# Patient Record
Sex: MALE | Race: WHITE | NOT HISPANIC OR LATINO | Employment: FULL TIME | URBAN - METROPOLITAN AREA
[De-identification: names, ages, dates, MRNs, and addresses within clinical notes are randomized per-mention and may not be internally consistent; named-entity substitution may affect disease eponyms.]

---

## 2017-10-24 ENCOUNTER — HOSPITAL ENCOUNTER (EMERGENCY)
Facility: HOSPITAL | Age: 27
Discharge: HOME | End: 2017-10-24
Attending: EMERGENCY MEDICINE | Admitting: EMERGENCY MEDICINE

## 2017-10-24 ENCOUNTER — APPOINTMENT (OUTPATIENT)
Dept: ULTRASOUND IMAGING | Facility: HOSPITAL | Age: 27
End: 2017-10-24

## 2017-10-24 VITALS
RESPIRATION RATE: 18 BRPM | HEART RATE: 112 BPM | HEIGHT: 67 IN | SYSTOLIC BLOOD PRESSURE: 162 MMHG | TEMPERATURE: 97.6 F | WEIGHT: 215 LBS | BODY MASS INDEX: 33.74 KG/M2 | OXYGEN SATURATION: 100 % | DIASTOLIC BLOOD PRESSURE: 90 MMHG

## 2017-10-24 DIAGNOSIS — R00.9 ELEVATED HEART RATE WITH ELEVATED BLOOD PRESSURE WITHOUT DIAGNOSIS OF HYPERTENSION: ICD-10-CM

## 2017-10-24 DIAGNOSIS — S76.211A GROIN STRAIN, RIGHT, INITIAL ENCOUNTER: Primary | ICD-10-CM

## 2017-10-24 DIAGNOSIS — R03.0 ELEVATED HEART RATE WITH ELEVATED BLOOD PRESSURE WITHOUT DIAGNOSIS OF HYPERTENSION: ICD-10-CM

## 2017-10-24 LAB
ANION GAP SERPL CALCULATED.3IONS-SCNC: 5 MMOL/L (ref 3–11)
BASOPHILS # BLD AUTO: 0.04 10*3/MM3 (ref 0–0.2)
BASOPHILS NFR BLD AUTO: 0.4 % (ref 0–1)
BILIRUB UR QL STRIP: NEGATIVE
BUN BLD-MCNC: 15 MG/DL (ref 9–23)
BUN/CREAT SERPL: 13.6 (ref 7–25)
CALCIUM SPEC-SCNC: 9.4 MG/DL (ref 8.7–10.4)
CHLORIDE SERPL-SCNC: 104 MMOL/L (ref 99–109)
CLARITY UR: CLEAR
CO2 SERPL-SCNC: 28 MMOL/L (ref 20–31)
COLOR UR: YELLOW
CREAT BLD-MCNC: 1.1 MG/DL (ref 0.6–1.3)
DEPRECATED RDW RBC AUTO: 39.7 FL (ref 37–54)
EOSINOPHIL # BLD AUTO: 0.29 10*3/MM3 (ref 0–0.3)
EOSINOPHIL NFR BLD AUTO: 2.6 % (ref 0–3)
ERYTHROCYTE [DISTWIDTH] IN BLOOD BY AUTOMATED COUNT: 12.5 % (ref 11.3–14.5)
GFR SERPL CREATININE-BSD FRML MDRD: 80 ML/MIN/1.73
GLUCOSE BLD-MCNC: 119 MG/DL (ref 70–100)
GLUCOSE UR STRIP-MCNC: NEGATIVE MG/DL
HCT VFR BLD AUTO: 49.2 % (ref 38.9–50.9)
HGB BLD-MCNC: 16.7 G/DL (ref 13.1–17.5)
HGB UR QL STRIP.AUTO: NEGATIVE
IMM GRANULOCYTES # BLD: 0.02 10*3/MM3 (ref 0–0.03)
IMM GRANULOCYTES NFR BLD: 0.2 % (ref 0–0.6)
KETONES UR QL STRIP: NEGATIVE
LEUKOCYTE ESTERASE UR QL STRIP.AUTO: NEGATIVE
LYMPHOCYTES # BLD AUTO: 2.73 10*3/MM3 (ref 0.6–4.8)
LYMPHOCYTES NFR BLD AUTO: 24.7 % (ref 24–44)
MCH RBC QN AUTO: 29.6 PG (ref 27–31)
MCHC RBC AUTO-ENTMCNC: 33.9 G/DL (ref 32–36)
MCV RBC AUTO: 87.2 FL (ref 80–99)
MONOCYTES # BLD AUTO: 0.89 10*3/MM3 (ref 0–1)
MONOCYTES NFR BLD AUTO: 8 % (ref 0–12)
NEUTROPHILS # BLD AUTO: 7.09 10*3/MM3 (ref 1.5–8.3)
NEUTROPHILS NFR BLD AUTO: 64.1 % (ref 41–71)
NITRITE UR QL STRIP: NEGATIVE
PH UR STRIP.AUTO: 6.5 [PH] (ref 5–8)
PLATELET # BLD AUTO: 199 10*3/MM3 (ref 150–450)
PMV BLD AUTO: 9.8 FL (ref 6–12)
POTASSIUM BLD-SCNC: 3.8 MMOL/L (ref 3.5–5.5)
PROT UR QL STRIP: NEGATIVE
RBC # BLD AUTO: 5.64 10*6/MM3 (ref 4.2–5.76)
SODIUM BLD-SCNC: 137 MMOL/L (ref 132–146)
SP GR UR STRIP: 1.03 (ref 1–1.03)
UROBILINOGEN UR QL STRIP: NORMAL
WBC NRBC COR # BLD: 11.06 10*3/MM3 (ref 3.5–10.8)

## 2017-10-24 PROCEDURE — 85025 COMPLETE CBC W/AUTO DIFF WBC: CPT | Performed by: PHYSICIAN ASSISTANT

## 2017-10-24 PROCEDURE — 81003 URINALYSIS AUTO W/O SCOPE: CPT | Performed by: PHYSICIAN ASSISTANT

## 2017-10-24 PROCEDURE — 99283 EMERGENCY DEPT VISIT LOW MDM: CPT

## 2017-10-24 PROCEDURE — 80048 BASIC METABOLIC PNL TOTAL CA: CPT | Performed by: PHYSICIAN ASSISTANT

## 2017-10-24 PROCEDURE — 76870 US EXAM SCROTUM: CPT

## 2017-10-24 PROCEDURE — 93976 VASCULAR STUDY: CPT

## 2017-10-24 RX ORDER — HYDROCODONE BITARTRATE AND ACETAMINOPHEN 5; 325 MG/1; MG/1
1 TABLET ORAL ONCE
Status: COMPLETED | OUTPATIENT
Start: 2017-10-24 | End: 2017-10-24

## 2017-10-24 RX ORDER — ALBUTEROL SULFATE 2.5 MG/3ML
2.5 SOLUTION RESPIRATORY (INHALATION) EVERY 4 HOURS PRN
COMMUNITY
End: 2019-11-01

## 2017-10-24 RX ORDER — CETIRIZINE HYDROCHLORIDE 10 MG/1
10 TABLET ORAL DAILY
COMMUNITY
End: 2019-11-01

## 2017-10-24 RX ADMIN — HYDROCODONE BITARTRATE AND ACETAMINOPHEN 1 TABLET: 5; 325 TABLET ORAL at 22:59

## 2017-10-25 NOTE — ED PROVIDER NOTES
Subjective   HPI Comments: This is a 26-year-old male that presents to the ER with right inguinal/groin pain that started spontaneously 2 hours prior to arrival.  Patient says it was intense and sharp.  It radiated from his right groin to his right anterior thigh.  It lasted about 10 minutes and then resolved on its own.  It has occurred at least 2-3 times in the last 2 hours.  He denies any previous similar symptoms.  He denies any testicular pain or swelling.  He denies any bloating sensation, such as a herniated to the inguinal canal.  He denies any low back pain.  He denies any numbness or tingling to his lower extremities.  He denies any urinary symptoms including dysuria, urgency, frequency, or hematuria.  He has not taken anything for the above symptoms.  He has never had any similar symptoms.  He denies any previous abdominal surgery.  He denies any fever or chills. Patient denies any frequent lifting or twisting.  He works at [employer] on the staff there.    Patient is a 26 y.o. male presenting with groin injury.   History provided by:  Patient  Groin Injury   Presenting symptoms: no dysuria, no penile discharge and no scrotal pain    Context: spontaneously    Context: not after injury, not after intercourse, not after urination, not during intercourse and not during urination    Relieved by:  Nothing  Worsened by:  Nothing  Ineffective treatments:  None tried  Associated symptoms: groin pain (right groin pain)    Associated symptoms: no abdominal pain, no diarrhea, no fever, no flank pain, no genital itching, no genital lesions, no genital rash, no nausea, no scrotal swelling, no urinary frequency, no urinary hesitation, no urinary incontinence, no urinary retention and no vomiting    Risk factors: no bladder surgery, no erectile dysfunction, no kidney stones, does not have multiple sexual partners, no new sexual partner, no recent infection and no unprotected sex        Review of Systems  "  Constitutional: Negative for appetite change, chills, fatigue and fever.   HENT: Negative.    Respiratory: Negative for cough, chest tightness, shortness of breath and wheezing.    Cardiovascular: Negative for chest pain and palpitations.   Gastrointestinal: Negative for abdominal distention, abdominal pain, constipation (Normal BM today), diarrhea, nausea and vomiting.   Genitourinary: Negative for bladder incontinence, difficulty urinating, discharge, dysuria, flank pain, frequency, hesitancy and scrotal swelling.        Right groin pain     Musculoskeletal: Negative.    Skin: Negative.    Neurological: Negative for dizziness, syncope, weakness, light-headedness and headaches.   Psychiatric/Behavioral: Negative.        Past Medical History:   Diagnosis Date   • Hypertension     STATES \"ITS WHITE COAT HYPERTENSION\"       Allergies   Allergen Reactions   • Penicillins        Past Surgical History:   Procedure Laterality Date   • MOUTH SURGERY         History reviewed. No pertinent family history.    Social History     Social History   • Marital status:      Spouse name: N/A   • Number of children: N/A   • Years of education: N/A     Social History Main Topics   • Smoking status: Never Smoker   • Smokeless tobacco: None   • Alcohol use No   • Drug use: No   • Sexual activity: Not Asked     Other Topics Concern   • None     Social History Narrative   • None           Objective   Physical Exam   Constitutional: He is oriented to person, place, and time. He appears well-developed and well-nourished. No distress.   HENT:   Head: Normocephalic and atraumatic.   Eyes: Conjunctivae are normal. No scleral icterus.   Neck: Normal range of motion. Neck supple.   Cardiovascular: Normal rate, regular rhythm and normal heart sounds.    Pulmonary/Chest: Effort normal and breath sounds normal. No respiratory distress.   Abdominal: Soft. He exhibits no distension. There is no tenderness. Hernia confirmed negative in the " right inguinal area and confirmed negative in the left inguinal area.   Genitourinary: Testes normal and penis normal. Circumcised.         Musculoskeletal: Normal range of motion. He exhibits no edema.   Lymphadenopathy:     He has no cervical adenopathy. No inguinal adenopathy noted on the right or left side.   Neurological: He is alert and oriented to person, place, and time.   Skin: Skin is warm and dry. He is not diaphoretic.   Psychiatric: His speech is normal and behavior is normal. His mood appears anxious.   Patient appears anxious.  HR increased to 140's when I told him I had to do a scrotal exam.   Nursing note and vitals reviewed.      Procedures         ED Course  ED Course   Comment By Time   Patient's blood pressure and heart rate are elevated.  He denies personal history of hypertension.  He says that he was thoroughly worked up by PCP 3 years ago for elevated blood pressure and tachycardia.  All workup was negative.  Patient denies any over-the-counter supplements or drug use.  He says he has no primary care physician at this time. Laurel Loera PA-C 10/24 2242   Discussed the case with Dr. Parr.  He went and examined the patient.  He gave him a Norco for pain in hopes of bringing his blood pressure down.  He also ordered a testicular ultrasound to rule out torsion. Laurel Loera PA-C 10/24 2319   UA within normal limits.  Testicular ultrasound reveals small bilateral hydroceles.  Unremarkable duplex.  No evidence of testicular torsion. Laurel Loera PA-C 10/24 1859        Recent Results (from the past 24 hour(s))   Basic Metabolic Panel    Collection Time: 10/24/17 10:51 PM   Result Value Ref Range    Glucose 119 (H) 70 - 100 mg/dL    BUN 15 9 - 23 mg/dL    Creatinine 1.10 0.60 - 1.30 mg/dL    Sodium 137 132 - 146 mmol/L    Potassium 3.8 3.5 - 5.5 mmol/L    Chloride 104 99 - 109 mmol/L    CO2 28.0 20.0 - 31.0 mmol/L    Calcium 9.4 8.7 - 10.4 mg/dL    eGFR Non African Amer 80 >60 mL/min/1.73     BUN/Creatinine Ratio 13.6 7.0 - 25.0    Anion Gap 5.0 3.0 - 11.0 mmol/L   CBC Auto Differential    Collection Time: 10/24/17 10:51 PM   Result Value Ref Range    WBC 11.06 (H) 3.50 - 10.80 10*3/mm3    RBC 5.64 4.20 - 5.76 10*6/mm3    Hemoglobin 16.7 13.1 - 17.5 g/dL    Hematocrit 49.2 38.9 - 50.9 %    MCV 87.2 80.0 - 99.0 fL    MCH 29.6 27.0 - 31.0 pg    MCHC 33.9 32.0 - 36.0 g/dL    RDW 12.5 11.3 - 14.5 %    RDW-SD 39.7 37.0 - 54.0 fl    MPV 9.8 6.0 - 12.0 fL    Platelets 199 150 - 450 10*3/mm3    Neutrophil % 64.1 41.0 - 71.0 %    Lymphocyte % 24.7 24.0 - 44.0 %    Monocyte % 8.0 0.0 - 12.0 %    Eosinophil % 2.6 0.0 - 3.0 %    Basophil % 0.4 0.0 - 1.0 %    Immature Grans % 0.2 0.0 - 0.6 %    Neutrophils, Absolute 7.09 1.50 - 8.30 10*3/mm3    Lymphocytes, Absolute 2.73 0.60 - 4.80 10*3/mm3    Monocytes, Absolute 0.89 0.00 - 1.00 10*3/mm3    Eosinophils, Absolute 0.29 0.00 - 0.30 10*3/mm3    Basophils, Absolute 0.04 0.00 - 0.20 10*3/mm3    Immature Grans, Absolute 0.02 0.00 - 0.03 10*3/mm3   Urinalysis With / Culture If Indicated - Urine, Clean Catch    Collection Time: 10/24/17 11:17 PM   Result Value Ref Range    Color, UA Yellow Yellow, Straw    Appearance, UA Clear Clear    pH, UA 6.5 5.0 - 8.0    Specific Gravity, UA 1.026 1.001 - 1.030    Glucose, UA Negative Negative    Ketones, UA Negative Negative    Bilirubin, UA Negative Negative    Blood, UA Negative Negative    Protein, UA Negative Negative    Leuk Esterase, UA Negative Negative    Nitrite, UA Negative Negative    Urobilinogen, UA 0.2 E.U./dL 0.2 - 1.0 E.U./dL     Note: In addition to lab results from this visit, the labs listed above may include labs taken at another facility or during a different encounter within the last 24 hours. Please correlate lab times with ED admission and discharge times for further clarification of the services performed during this visit.    US Testicular or Ovarian Vascular Limited   Final Result   Unremarkable grayscale  examination of the testes.   Small bilateral hydroceles.      _______________________________________________      EXAM:   US Duplex Arterial/Venous of the Scrotum, Complete      CLINICAL HISTORY:   Pain; Groin pain; Patient HX: Right groin pain radiating down leg    intermittently x 2 hours//pt states pain is now not as frequent//denies    surgeries//denies testicular pain//      TECHNIQUE:   Real-time duplex ultrasound scan of the arterial and venous flow of the scrotal    contents with color Doppler flow and spectral waveform analysis.      COMPARISON:   No relevant prior studies available.      FINDINGS:   Symmetric color Doppler flow demonstrated within the testes bilaterally.   Appropriate arterial and venous spectral wave forms demonstrated within the    testes bilaterally.   No increased flow about the epididymides.   Mildly prominent vessels about the lateral aspect of the testes without    increase upon Valsalva.      IMPRESSION:      Unremarkable duplex.   No evidence for testicular torsion.         THIS DOCUMENT HAS BEEN ELECTRONICALLY SIGNED BY TIA GUTIERREZ MD      US Scrotum & Testicles   Final Result   Unremarkable grayscale examination of the testes.   Small bilateral hydroceles.      _______________________________________________      EXAM:   US Duplex Arterial/Venous of the Scrotum, Complete      CLINICAL HISTORY:   Pain; Groin pain; Patient HX: Right groin pain radiating down leg    intermittently x 2 hours//pt states pain is now not as frequent//denies    surgeries//denies testicular pain//      TECHNIQUE:   Real-time duplex ultrasound scan of the arterial and venous flow of the scrotal    contents with color Doppler flow and spectral waveform analysis.      COMPARISON:   No relevant prior studies available.      FINDINGS:   Symmetric color Doppler flow demonstrated within the testes bilaterally.   Appropriate arterial and venous spectral wave forms demonstrated within the    testes bilaterally.  "  No increased flow about the epididymides.   Mildly prominent vessels about the lateral aspect of the testes without    increase upon Valsalva.      IMPRESSION:      Unremarkable duplex.   No evidence for testicular torsion.         THIS DOCUMENT HAS BEEN ELECTRONICALLY SIGNED BY TIA GUTIERREZ MD        Vitals:    10/24/17 2213   BP: (!) 170/105   BP Location: Left arm   Patient Position: Sitting   Pulse: (!) 137   Resp: 20   Temp: 97.6 °F (36.4 °C)   SpO2: 97%   Weight: 215 lb (97.5 kg)   Height: 67\" (170.2 cm)     Medications   HYDROcodone-acetaminophen (NORCO) 5-325 MG per tablet 1 tablet (1 tablet Oral Given 10/24/17 1434)     ECG/EMG Results (last 24 hours)     ** No results found for the last 24 hours. **                  [facility]    Final diagnoses:   Groin strain, right, initial encounter   Elevated heart rate with elevated blood pressure without diagnosis of hypertension            Laurel Loera PA-C  10/24/17 3227    "

## 2017-10-25 NOTE — DISCHARGE INSTRUCTIONS
Labs, urinalysis, and testicular ultrasound showed no acute abnormality.  There is no evidence of testicular torsion.  Recommend patient to take multiple home blood pressure readings did elevated blood pressure and heart rate and follow up closely with primary care physician.  Patient says that he no longer sees Dr. Arcos.  I will give him a list of accepting physicians in the local area to follow up with.  He is to return if any worsening symptoms.    Follow up with one of the [facility] physician groups below to setup primary care. If you have trouble following up, please call Liliam Ling, our transitional care nurse, at [phone].    (Dr. Murphy, Dr. Calero, Dr. Salas, and Dr. Rosas.)  [facility], Primary Care, [phone], [address], [place] [address]    [facility], Primary Care, [phone], [address], [address] [place], [address] [facility] Medical [facility], Primary Care, [phone], [address], [address] [place], [address] [facility] Medical [facility], Primary Care, [phone], [address] [place] [facility], [address] [place], [address]     [place] [facility], Primary Care, [phone], [address], [address] [place], [address]    [place] [facility], Primary Care, [phone], [address]    [place], [address] [facility] Medical [facility], Primary Care, [phone], [address], [address] [place], [address] [facility], Primary Care, [phone], [address], [address] [place], [address] [facility]) [facility], Primary Care, [phone], [address], [address] [place], [address] [facility], Primary Care, [phone],  [address], [address] [place], [id number] [place] [address]     ([facility]) [facility], Primary Care, [phone], [address], [place], [address] [place] [address], Primary Care, [phone], [address] [place] [address]

## 2017-12-05 ENCOUNTER — OFFICE VISIT (OUTPATIENT)
Dept: INTERNAL MEDICINE | Facility: CLINIC | Age: 27
End: 2017-12-05

## 2017-12-05 VITALS — WEIGHT: 214 LBS | SYSTOLIC BLOOD PRESSURE: 148 MMHG | BODY MASS INDEX: 33.52 KG/M2 | DIASTOLIC BLOOD PRESSURE: 86 MMHG

## 2017-12-05 DIAGNOSIS — S67.191A CRUSHING INJURY OF LEFT INDEX FINGER, INITIAL ENCOUNTER: Primary | ICD-10-CM

## 2017-12-05 PROCEDURE — 99202 OFFICE O/P NEW SF 15 MIN: CPT | Performed by: NURSE PRACTITIONER

## 2017-12-05 RX ORDER — ALBUTEROL SULFATE 90 UG/1
POWDER, METERED RESPIRATORY (INHALATION) AS NEEDED
Refills: 0 | COMMUNITY
Start: 2017-10-24 | End: 2019-11-01

## 2017-12-05 NOTE — PROGRESS NOTES
"  Chief Complaint   Patient presents with   • Left pointer finger pain and swelling, slammed in a car door       HPI  Hayder Farley is a 27 y.o. male who presents s/p slamming left index finger in a car door 4 days ago. Skin intact. Seems more swollen and tip feels a little tingly. Has been taking ibuprofen.      [facility]  The following portions of the patient's history were reviewed and updated as appropriate: allergies, current medications, past family history, past medical history, past social history, past surgical history and problem list.    Past Medical History:   Diagnosis Date   • Hypertension     STATES \"ITS WHITE COAT HYPERTENSION\"     Past Surgical History:   Procedure Laterality Date   • MOUTH SURGERY       There are no active problems to display for this patient.    Social History   Substance Use Topics   • Smoking status: Never Smoker   • Smokeless tobacco: Never Used   • Alcohol use No     Current Outpatient Prescriptions on File Prior to Visit   Medication Sig Dispense Refill   • albuterol (PROVENTIL) (2.5 MG/3ML) 0.083% nebulizer solution Take 2.5 mg by nebulization Every 4 (Four) Hours As Needed for Wheezing.     • cetirizine (zyrTEC) 10 MG tablet Take 10 mg by mouth Daily.       No current facility-administered medications on file prior to visit.          Review of Systems        Objective   Blood pressure 148/86, weight 97.1 kg (214 lb).  Body mass index is 33.52 kg/(m^2).      Physical Exam   Constitutional: He appears well-developed and well-nourished. No distress.   Cardiovascular: Normal rate.    Pulmonary/Chest: Effort normal.   Musculoskeletal:        Left hand: He exhibits normal capillary refill.        Hands:  Distal left index finger with soft swelling, there is ecchymosis visible under nail. No warmth or drainage.   Skin: Skin is warm and dry.   Psychiatric: He has a normal mood and affect. His behavior is normal.             ASSESSMENT/PLAN  1. Crushing injury of left index finger, " initial encounter    - XR Finger 2+ View Left (In Office); Future  Soak in epsom salts & tepid water 3-4 times/day, can asael tape for comfort. Tylenol prn.  RTC or pcp if drainage, fever, increased pain.      Plan of care reviewed with patient at the conclusion of today's visit. Education was provided in regards to diagnosis, management and any prescribed or recommended OTC medications.  Patient verbalizes Understanding of and agreement with management plan.      FOLLOW-UPprn    No future appointments.      Electronically signed by:  BRENDON Salomon  12/05/2017

## 2017-12-06 ENCOUNTER — HOSPITAL ENCOUNTER (OUTPATIENT)
Dept: GENERAL RADIOLOGY | Facility: HOSPITAL | Age: 27
Discharge: HOME | End: 2017-12-06
Admitting: NURSE PRACTITIONER

## 2017-12-06 ENCOUNTER — TELEPHONE (OUTPATIENT)
Dept: INTERNAL MEDICINE | Facility: CLINIC | Age: 27
End: 2017-12-06

## 2017-12-06 DIAGNOSIS — S67.191A CRUSHING INJURY OF LEFT INDEX FINGER, INITIAL ENCOUNTER: ICD-10-CM

## 2017-12-06 PROCEDURE — 73140 X-RAY EXAM OF FINGER(S): CPT

## 2017-12-06 NOTE — TELEPHONE ENCOUNTER
----- Message from BRENDON Salomon sent at 12/6/2017 12:09 PM EST -----  Please let patient know that his xray did not show any abnormalities

## 2019-11-01 ENCOUNTER — OFFICE VISIT (OUTPATIENT)
Dept: INTERNAL MEDICINE | Facility: CLINIC | Age: 29
End: 2019-11-01

## 2019-11-01 VITALS
DIASTOLIC BLOOD PRESSURE: 84 MMHG | TEMPERATURE: 98.3 F | SYSTOLIC BLOOD PRESSURE: 144 MMHG | HEART RATE: 98 BPM | HEIGHT: 71 IN | WEIGHT: 221.2 LBS | BODY MASS INDEX: 30.97 KG/M2 | OXYGEN SATURATION: 99 %

## 2019-11-01 DIAGNOSIS — J45.40 MODERATE PERSISTENT ASTHMA WITHOUT COMPLICATION: ICD-10-CM

## 2019-11-01 DIAGNOSIS — I10 ESSENTIAL HYPERTENSION: Primary | ICD-10-CM

## 2019-11-01 DIAGNOSIS — E66.9 CLASS 1 OBESITY WITH SERIOUS COMORBIDITY AND BODY MASS INDEX (BMI) OF 31.0 TO 31.9 IN ADULT, UNSPECIFIED OBESITY TYPE: ICD-10-CM

## 2019-11-01 DIAGNOSIS — Z23 NEED FOR IMMUNIZATION AGAINST INFLUENZA: ICD-10-CM

## 2019-11-01 PROCEDURE — 99204 OFFICE O/P NEW MOD 45 MIN: CPT | Performed by: FAMILY MEDICINE

## 2019-11-01 PROCEDURE — 90471 IMMUNIZATION ADMIN: CPT | Performed by: FAMILY MEDICINE

## 2019-11-01 PROCEDURE — 90674 CCIIV4 VAC NO PRSV 0.5 ML IM: CPT | Performed by: FAMILY MEDICINE

## 2019-11-01 RX ORDER — LISINOPRIL 10 MG/1
10 TABLET ORAL DAILY
COMMUNITY
End: 2019-11-01 | Stop reason: SDUPTHER

## 2019-11-01 RX ORDER — LISINOPRIL 10 MG/1
10 TABLET ORAL DAILY
Qty: 10 TABLET | Refills: 3 | Status: SHIPPED | OUTPATIENT
Start: 2019-11-01 | End: 2019-12-04 | Stop reason: DRUGHIGH

## 2019-11-01 RX ORDER — METOPROLOL SUCCINATE 25 MG/1
25 TABLET, EXTENDED RELEASE ORAL DAILY
COMMUNITY
End: 2019-12-04 | Stop reason: SDUPTHER

## 2019-11-01 RX ORDER — MONTELUKAST SODIUM 10 MG/1
10 TABLET ORAL NIGHTLY
Qty: 30 TABLET | Refills: 5 | Status: SHIPPED | OUTPATIENT
Start: 2019-11-01

## 2019-11-01 RX ORDER — MONTELUKAST SODIUM 10 MG/1
10 TABLET ORAL NIGHTLY
COMMUNITY
End: 2019-11-01 | Stop reason: SDUPTHER

## 2019-11-01 RX ORDER — ALBUTEROL SULFATE 90 UG/1
2 AEROSOL, METERED RESPIRATORY (INHALATION) EVERY 4 HOURS PRN
Qty: 18 G | Refills: 3 | Status: SHIPPED | OUTPATIENT
Start: 2019-11-01

## 2019-11-01 NOTE — PATIENT INSTRUCTIONS
"DASH Eating Plan  DASH stands for \"Dietary Approaches to Stop Hypertension.\" The DASH eating plan is a healthy eating plan that has been shown to reduce high blood pressure (hypertension). It may also reduce your risk for type 2 diabetes, heart disease, and stroke. The DASH eating plan may also help with weight loss.  What are tips for following this plan?    General guidelines  · Avoid eating more than 2,300 mg (milligrams) of salt (sodium) a day. If you have hypertension, you may need to reduce your sodium intake to 1,500 mg a day.  · Limit alcohol intake to no more than 1 drink a day for nonpregnant women and 2 drinks a day for men. One drink equals 12 oz of beer, 5 oz of wine, or 1½ oz of hard liquor.  · Work with your health care provider to maintain a healthy body weight or to lose weight. Ask what an ideal weight is for you.  · Get at least 30 minutes of exercise that causes your heart to beat faster (aerobic exercise) most days of the week. Activities may include walking, swimming, or biking.  · Work with your health care provider or diet and nutrition specialist (dietitian) to adjust your eating plan to your individual calorie needs.  Reading food labels    · Check food labels for the amount of sodium per serving. Choose foods with less than 5 percent of the Daily Value of sodium. Generally, foods with less than 300 mg of sodium per serving fit into this eating plan.  · To find whole grains, look for the word \"whole\" as the first word in the ingredient list.  Shopping  · Buy products labeled as \"low-sodium\" or \"no salt added.\"  · Buy fresh foods. Avoid canned foods and premade or frozen meals.  Cooking  · Avoid adding salt when cooking. Use salt-free seasonings or herbs instead of table salt or sea salt. Check with your health care provider or pharmacist before using salt substitutes.  · Do not ledbetter foods. Cook foods using healthy methods such as baking, boiling, grilling, and broiling instead.  · Cook with " heart-healthy oils, such as olive, canola, soybean, or sunflower oil.  Meal planning  · Eat a balanced diet that includes:  ? 5 or more servings of fruits and vegetables each day. At each meal, try to fill half of your plate with fruits and vegetables.  ? Up to 6-8 servings of whole grains each day.  ? Less than 6 oz of lean meat, poultry, or fish each day. A 3-oz serving of meat is about the same size as a deck of cards. One egg equals 1 oz.  ? 2 servings of low-fat dairy each day.  ? A serving of nuts, seeds, or beans 5 times each week.  ? Heart-healthy fats. Healthy fats called Omega-3 fatty acids are found in foods such as flaxseeds and coldwater fish, like sardines, salmon, and mackerel.  · Limit how much you eat of the following:  ? Canned or prepackaged foods.  ? Food that is high in trans fat, such as fried foods.  ? Food that is high in saturated fat, such as fatty meat.  ? Sweets, desserts, sugary drinks, and other foods with added sugar.  ? Full-fat dairy products.  · Do not salt foods before eating.  · Try to eat at least 2 vegetarian meals each week.  · Eat more home-cooked food and less restaurant, buffet, and fast food.  · When eating at a restaurant, ask that your food be prepared with less salt or no salt, if possible.  What foods are recommended?  The items listed may not be a complete list. Talk with your dietitian about what dietary choices are best for you.  Grains  Whole-grain or whole-wheat bread. Whole-grain or whole-wheat pasta. Brown rice. Oatmeal. Quinoa. Bulgur. Whole-grain and low-sodium cereals. Petra bread. Low-fat, low-sodium crackers. Whole-wheat flour tortillas.  Vegetables  Fresh or frozen vegetables (raw, steamed, roasted, or grilled). Low-sodium or reduced-sodium tomato and vegetable juice. Low-sodium or reduced-sodium tomato sauce and tomato paste. Low-sodium or reduced-sodium canned vegetables.  Fruits  All fresh, dried, or frozen fruit. Canned fruit in natural juice (without  added sugar).  Meat and other protein foods  Skinless chicken or turkey. Ground chicken or turkey. Pork with fat trimmed off. Fish and seafood. Egg whites. Dried beans, peas, or lentils. Unsalted nuts, nut butters, and seeds. Unsalted canned beans. Lean cuts of beef with fat trimmed off. Low-sodium, lean deli meat.  Dairy  Low-fat (1%) or fat-free (skim) milk. Fat-free, low-fat, or reduced-fat cheeses. Nonfat, low-sodium ricotta or cottage cheese. Low-fat or nonfat yogurt. Low-fat, low-sodium cheese.  Fats and oils  Soft margarine without trans fats. Vegetable oil. Low-fat, reduced-fat, or light mayonnaise and salad dressings (reduced-sodium). Canola, safflower, olive, soybean, and sunflower oils. Avocado.  Seasoning and other foods  Herbs. Spices. Seasoning mixes without salt. Unsalted popcorn and pretzels. Fat-free sweets.  What foods are not recommended?  The items listed may not be a complete list. Talk with your dietitian about what dietary choices are best for you.  Grains  Baked goods made with fat, such as croissants, muffins, or some breads. Dry pasta or rice meal packs.  Vegetables  Creamed or fried vegetables. Vegetables in a cheese sauce. Regular canned vegetables (not low-sodium or reduced-sodium). Regular canned tomato sauce and paste (not low-sodium or reduced-sodium). Regular tomato and vegetable juice (not low-sodium or reduced-sodium). Pickles. Olives.  Fruits  Canned fruit in a light or heavy syrup. Fried fruit. Fruit in cream or butter sauce.  Meat and other protein foods  Fatty cuts of meat. Ribs. Fried meat. Lee. Sausage. Bologna and other processed lunch meats. Salami. Fatback. Hotdogs. Bratwurst. Salted nuts and seeds. Canned beans with added salt. Canned or smoked fish. Whole eggs or egg yolks. Chicken or turkey with skin.  Dairy  Whole or 2% milk, cream, and half-and-half. Whole or full-fat cream cheese. Whole-fat or sweetened yogurt. Full-fat cheese. Nondairy creamers. Whipped toppings.  Processed cheese and cheese spreads.  Fats and oils  Butter. Stick margarine. Lard. Shortening. Ghee. Lee fat. Tropical oils, such as coconut, palm kernel, or palm oil.  Seasoning and other foods  Salted popcorn and pretzels. Onion salt, garlic salt, seasoned salt, table salt, and sea salt. Worcestershire sauce. Tartar sauce. Barbecue sauce. Teriyaki sauce. Soy sauce, including reduced-sodium. Steak sauce. Canned and packaged gravies. Fish sauce. Oyster sauce. Cocktail sauce. Horseradish that you find on the shelf. Ketchup. Mustard. Meat flavorings and tenderizers. Bouillon cubes. Hot sauce and Tabasco sauce. Premade or packaged marinades. Premade or packaged taco seasonings. Relishes. Regular salad dressings.  Where to find more information:  · National Heart, Lung, and Blood [place]: www.nhlbi.nih.gov  · American Heart Association: www.heart.org  Summary  · The DASH eating plan is a healthy eating plan that has been shown to reduce high blood pressure (hypertension). It may also reduce your risk for type 2 diabetes, heart disease, and stroke.  · With the DASH eating plan, you should limit salt (sodium) intake to 2,300 mg a day. If you have hypertension, you may need to reduce your sodium intake to 1,500 mg a day.  · When on the DASH eating plan, aim to eat more fresh fruits and vegetables, whole grains, lean proteins, low-fat dairy, and heart-healthy fats.  · Work with your health care provider or diet and nutrition specialist (dietitian) to adjust your eating plan to your individual calorie needs.  This information is not intended to replace advice given to you by your health care provider. Make sure you discuss any questions you have with your health care provider.  Document Released: 12/06/2012 Document Revised: 12/11/2017 Document Reviewed: 12/11/2017  [employer] Interactive Patient Education © 2019 [employer]

## 2019-11-01 NOTE — PROGRESS NOTES
"Subjective   Hayder Farley is a 28 y.o. male.     Chief Complaint   Patient presents with   • Establish Care   • Hypertension     to take over maintanence medication       Visit Vitals  /84   Pulse 98   Temp 98.3 °F (36.8 °C)   Ht 179.1 cm (70.5\")   Wt 100 kg (221 lb 3.2 oz)   SpO2 99%   BMI 31.29 kg/m²         Hypertension   This is a chronic problem. The current episode started more than 1 year ago. The problem is unchanged. The problem is controlled. Pertinent negatives include no anxiety, blurred vision, chest pain, headaches, malaise/fatigue, neck pain, orthopnea, palpitations, peripheral edema, PND, shortness of breath or sweats. There are no associated agents to hypertension. Risk factors for coronary artery disease include family history and male gender. Current antihypertension treatment includes ACE inhibitors and beta blockers. There are no compliance problems.  There is no history of angina, kidney disease, CAD/MI, CVA, heart failure, left ventricular hypertrophy, PVD or retinopathy.   Asthma   There is no chest tightness, cough, difficulty breathing, frequent throat clearing, hemoptysis, hoarse voice, shortness of breath, sputum production or wheezing. This is a recurrent problem. The current episode started more than 1 year ago. The problem occurs rarely. The problem has been resolved (no symptoms on singulair). Associated symptoms include postnasal drip, rhinorrhea and weight loss (pt trying to lose weight). Pertinent negatives include no appetite change, chest pain, dyspnea on exertion, ear congestion, ear pain, fever, headaches, heartburn, malaise/fatigue, myalgias, nasal congestion, orthopnea, PND, sneezing, sore throat, sweats or trouble swallowing. His symptoms are aggravated by exercise. His symptoms are alleviated by leukotriene antagonist. He reports significant improvement on treatment. Risk factors for lung disease include animal exposure (pt has cats and is allergic). His past medical " "history is significant for asthma. There is no history of bronchiectasis, bronchitis, COPD, emphysema or pneumonia.        Pt is here to establish.  Pt is trying to lose weight and DASH diet.   Pt had [facility] BP last month at [facility].  The following portions of the patient's history were reviewed and updated as appropriate: allergies, current medications, past family history, past medical history, past social history, past surgical history and problem list.    Past Medical History:   Diagnosis Date   • Allergic 1995    As a toddler   • Asthma    • Essential hypertension 11/1/2019   • Hypertension     STATES \"ITS WHITE COAT HYPERTENSION\"      Past Surgical History:   Procedure Laterality Date   • MOUTH SURGERY        Family History   Problem Relation Age of Onset   • Cancer Mother         Breast Cancer   • Breast cancer Mother    • Heart attack Maternal Grandfather    • Heart disease Maternal Grandfather    • Heart attack Paternal Grandfather    • Heart disease Paternal Grandfather    • Asthma Father    • Hyperlipidemia Father    • Heart disease Paternal Uncle    • Heart disease Maternal Grandmother    • Heart disease Paternal Grandmother       Social History     Socioeconomic History   • Marital status:      Spouse name: Not on file   • Number of children: Not on file   • Years of education: Not on file   • Highest education level: Not on file   Tobacco Use   • Smoking status: Never Smoker   • Smokeless tobacco: Never Used   Substance and Sexual Activity   • Alcohol use: No   • Drug use: No   • Sexual activity: Yes     Partners: Female     Birth control/protection: None      Allergies   Allergen Reactions   • Penicillins Other (See Comments)     As baby, pt does not know reaction       Review of Systems   Constitutional: Positive for weight loss (pt trying to lose weight). Negative for appetite change, chills, diaphoresis, fatigue, fever and malaise/fatigue.   HENT: Positive for postnasal drip and " rhinorrhea. Negative for ear pain, hoarse voice, nosebleeds, sinus pressure, sneezing, sore throat and trouble swallowing.    Eyes: Negative.  Negative for blurred vision, redness and itching.   Respiratory: Negative.  Negative for cough, hemoptysis, sputum production, shortness of breath and wheezing.    Cardiovascular: Negative.  Negative for chest pain, dyspnea on exertion, palpitations, orthopnea and PND.   Gastrointestinal: Negative.  Negative for abdominal pain, constipation, diarrhea, heartburn, nausea and vomiting.   Endocrine: Negative.  Negative for cold intolerance and heat intolerance.   Genitourinary: Negative.  Negative for dysuria, frequency, hematuria and urgency.   Musculoskeletal: Negative.  Negative for arthralgias, back pain, myalgias and neck pain.   Skin: Negative.  Negative for color change and rash.   Allergic/Immunologic: Positive for environmental allergies.   Neurological: Negative.  Negative for dizziness, syncope, light-headedness and headaches.   Hematological: Negative.  Negative for adenopathy. Does not bruise/bleed easily.   Psychiatric/Behavioral: Negative.  Negative for dysphoric mood. The patient is not nervous/anxious.        Objective   Physical Exam   Constitutional: He is oriented to person, place, and time. He appears well-developed.   HENT:   Head: Normocephalic.   Right Ear: External ear normal.   Left Ear: External ear normal.   Nose: Nose normal.   Eyes: Conjunctivae, EOM and lids are normal. Pupils are equal, round, and reactive to light.   Neck: Trachea normal and normal range of motion. Neck supple. Carotid bruit is not present. No thyroid mass and no thyromegaly present.   Cardiovascular: Normal rate and regular rhythm.   No murmur heard.  Pulmonary/Chest: Effort normal and breath sounds normal. No respiratory distress. He has no decreased breath sounds. He has no wheezes. He has no rhonchi. He has no rales. He exhibits no tenderness.   Abdominal: Soft. Bowel sounds  are normal. There is no tenderness.   Musculoskeletal: Normal range of motion.   Neurological: He is alert and oriented to person, place, and time.   Skin: Skin is warm and dry.   Psychiatric: He has a normal mood and affect. His behavior is normal.   Nursing note and vitals reviewed.      Assessment/Plan   Hayder was seen today for establish care and hypertension.    Diagnoses and all orders for this visit:    Essential hypertension  -     Comprehensive Metabolic Panel  -     Lipid Panel  -     TSH  -     CBC & Differential  -     lisinopril (PRINIVIL,ZESTRIL) 10 MG tablet; Take 1 tablet by mouth Daily.  -     Urinalysis With Microscopic -    Need for immunization against influenza  -     Flucelvax Quad=>4Years ([id number])    Moderate persistent asthma without complication  -     montelukast (SINGULAIR) 10 MG tablet; Take 1 tablet by mouth Every Night.  -     albuterol sulfate  (90 Base) MCG/ACT inhaler; Inhale 2 puffs Every 4 (Four) Hours As Needed for Wheezing.    Class 1 obesity with serious comorbidity and body mass index (BMI) of 31.0 to 31.9 in adult, unspecified obesity type        Handout on DASH diet           Current Outpatient Medications:   •  lisinopril (PRINIVIL,ZESTRIL) 10 MG tablet, Take 1 tablet by mouth Daily., Disp: 10 tablet, Rfl: 3  •  metoprolol succinate XL (TOPROL-XL) 25 MG 24 hr tablet, Take 25 mg by mouth Daily., Disp: , Rfl:   •  montelukast (SINGULAIR) 10 MG tablet, Take 1 tablet by mouth Every Night., Disp: 30 tablet, Rfl: 5  •  albuterol sulfate  (90 Base) MCG/ACT inhaler, Inhale 2 puffs Every 4 (Four) Hours As Needed for Wheezing., Disp: 18 g, Rfl: 3    Return in about 4 weeks (around 11/29/2019), or if symptoms worsen or fail to improve, for Recheck, BP with nurse 1 week.

## 2019-11-02 LAB
ALBUMIN SERPL-MCNC: 5.1 G/DL (ref 3.5–5.2)
ALBUMIN/GLOB SERPL: 2.4 G/DL
ALP SERPL-CCNC: 92 U/L (ref 39–117)
ALT SERPL-CCNC: 23 U/L (ref 1–41)
APPEARANCE UR: CLEAR
AST SERPL-CCNC: 21 U/L (ref 1–40)
BACTERIA #/AREA URNS HPF: ABNORMAL /HPF
BASOPHILS # BLD AUTO: 0.05 10*3/MM3 (ref 0–0.2)
BASOPHILS NFR BLD AUTO: 0.6 % (ref 0–1.5)
BILIRUB SERPL-MCNC: 0.5 MG/DL (ref 0.2–1.2)
BILIRUB UR QL STRIP: NEGATIVE
BUN SERPL-MCNC: 12 MG/DL (ref 6–20)
BUN/CREAT SERPL: 12 (ref 7–25)
CALCIUM SERPL-MCNC: 9.8 MG/DL (ref 8.6–10.5)
CASTS URNS MICRO: ABNORMAL
CHLORIDE SERPL-SCNC: 99 MMOL/L (ref 98–107)
CHOLEST SERPL-MCNC: 203 MG/DL (ref 0–200)
CO2 SERPL-SCNC: 27.2 MMOL/L (ref 22–29)
COLOR UR: YELLOW
CREAT SERPL-MCNC: 1 MG/DL (ref 0.76–1.27)
EOSINOPHIL # BLD AUTO: 0.11 10*3/MM3 (ref 0–0.4)
EOSINOPHIL NFR BLD AUTO: 1.4 % (ref 0.3–6.2)
EPI CELLS #/AREA URNS HPF: ABNORMAL /HPF
ERYTHROCYTE [DISTWIDTH] IN BLOOD BY AUTOMATED COUNT: 12.5 % (ref 12.3–15.4)
GLOBULIN SER CALC-MCNC: 2.1 GM/DL
GLUCOSE SERPL-MCNC: 107 MG/DL (ref 65–99)
GLUCOSE UR QL: NEGATIVE
HCT VFR BLD AUTO: 49.2 % (ref 37.5–51)
HDLC SERPL-MCNC: 42 MG/DL (ref 40–60)
HGB BLD-MCNC: 16.6 G/DL (ref 13–17.7)
HGB UR QL STRIP: NEGATIVE
IMM GRANULOCYTES # BLD AUTO: 0.02 10*3/MM3 (ref 0–0.05)
IMM GRANULOCYTES NFR BLD AUTO: 0.3 % (ref 0–0.5)
KETONES UR QL STRIP: NEGATIVE
LDLC SERPL CALC-MCNC: 139 MG/DL (ref 0–100)
LEUKOCYTE ESTERASE UR QL STRIP: (no result)
LYMPHOCYTES # BLD AUTO: 1.46 10*3/MM3 (ref 0.7–3.1)
LYMPHOCYTES NFR BLD AUTO: 18.8 % (ref 19.6–45.3)
MCH RBC QN AUTO: 29.4 PG (ref 26.6–33)
MCHC RBC AUTO-ENTMCNC: 33.7 G/DL (ref 31.5–35.7)
MCV RBC AUTO: 87.1 FL (ref 79–97)
MONOCYTES # BLD AUTO: 0.9 10*3/MM3 (ref 0.1–0.9)
MONOCYTES NFR BLD AUTO: 11.6 % (ref 5–12)
NEUTROPHILS # BLD AUTO: 5.22 10*3/MM3 (ref 1.7–7)
NEUTROPHILS NFR BLD AUTO: 67.3 % (ref 42.7–76)
NITRITE UR QL STRIP: NEGATIVE
NRBC BLD AUTO-RTO: 0 /100 WBC (ref 0–0.2)
PH UR STRIP: 6.5 [PH] (ref 5–8)
PLATELET # BLD AUTO: 262 10*3/MM3 (ref 140–450)
POTASSIUM SERPL-SCNC: 5 MMOL/L (ref 3.5–5.2)
PROT SERPL-MCNC: 7.2 G/DL (ref 6–8.5)
PROT UR QL STRIP: NEGATIVE
RBC # BLD AUTO: 5.65 10*6/MM3 (ref 4.14–5.8)
RBC #/AREA URNS HPF: ABNORMAL /HPF
SODIUM SERPL-SCNC: 138 MMOL/L (ref 136–145)
SP GR UR: 1.02 (ref 1–1.03)
TRIGL SERPL-MCNC: 109 MG/DL (ref 0–150)
TSH SERPL DL<=0.005 MIU/L-ACNC: 1.16 UIU/ML (ref 0.27–4.2)
UROBILINOGEN UR STRIP-MCNC: (no result) MG/DL
VLDLC SERPL CALC-MCNC: 21.8 MG/DL
WBC # BLD AUTO: 7.76 10*3/MM3 (ref 3.4–10.8)
WBC #/AREA URNS HPF: ABNORMAL /HPF

## 2019-12-04 ENCOUNTER — OFFICE VISIT (OUTPATIENT)
Dept: INTERNAL MEDICINE | Facility: CLINIC | Age: 29
End: 2019-12-04

## 2019-12-04 VITALS
HEIGHT: 71 IN | OXYGEN SATURATION: 99 % | DIASTOLIC BLOOD PRESSURE: 98 MMHG | TEMPERATURE: 97 F | HEART RATE: 134 BPM | WEIGHT: 223.8 LBS | BODY MASS INDEX: 31.33 KG/M2 | SYSTOLIC BLOOD PRESSURE: 140 MMHG

## 2019-12-04 DIAGNOSIS — I10 ESSENTIAL HYPERTENSION: Primary | ICD-10-CM

## 2019-12-04 PROCEDURE — 99213 OFFICE O/P EST LOW 20 MIN: CPT | Performed by: FAMILY MEDICINE

## 2019-12-04 RX ORDER — LISINOPRIL 20 MG/1
20 TABLET ORAL DAILY
Qty: 30 TABLET | Refills: 1 | Status: SHIPPED | OUTPATIENT
Start: 2019-12-04 | End: 2020-01-10 | Stop reason: SDUPTHER

## 2019-12-04 RX ORDER — CETIRIZINE HYDROCHLORIDE 10 MG/1
TABLET ORAL
COMMUNITY

## 2019-12-04 RX ORDER — METOPROLOL SUCCINATE 25 MG/1
25 TABLET, EXTENDED RELEASE ORAL DAILY
Qty: 30 TABLET | Refills: 1 | Status: SHIPPED | OUTPATIENT
Start: 2019-12-04 | End: 2020-01-10 | Stop reason: DRUGHIGH

## 2019-12-23 ENCOUNTER — CLINICAL SUPPORT (OUTPATIENT)
Dept: INTERNAL MEDICINE | Facility: CLINIC | Age: 29
End: 2019-12-23

## 2019-12-23 VITALS — DIASTOLIC BLOOD PRESSURE: 82 MMHG | SYSTOLIC BLOOD PRESSURE: 138 MMHG

## 2019-12-23 NOTE — PROGRESS NOTES
Pt present for nurse visit BP check.  Reading of 136/82 was obtained.  He has an upcoming appointment on 1/10/2019

## 2020-01-10 ENCOUNTER — OFFICE VISIT (OUTPATIENT)
Dept: INTERNAL MEDICINE | Facility: CLINIC | Age: 30
End: 2020-01-10

## 2020-01-10 VITALS
DIASTOLIC BLOOD PRESSURE: 86 MMHG | HEART RATE: 100 BPM | WEIGHT: 222 LBS | SYSTOLIC BLOOD PRESSURE: 128 MMHG | TEMPERATURE: 98 F | BODY MASS INDEX: 31.08 KG/M2 | OXYGEN SATURATION: 98 % | HEIGHT: 71 IN

## 2020-01-10 DIAGNOSIS — R73.09 ABNORMAL GLUCOSE: Primary | ICD-10-CM

## 2020-01-10 DIAGNOSIS — Z82.49 FAMILY HISTORY OF EARLY CAD: ICD-10-CM

## 2020-01-10 DIAGNOSIS — E66.9 CLASS 1 OBESITY WITH SERIOUS COMORBIDITY AND BODY MASS INDEX (BMI) OF 31.0 TO 31.9 IN ADULT, UNSPECIFIED OBESITY TYPE: ICD-10-CM

## 2020-01-10 DIAGNOSIS — R07.9 CHEST PAIN, UNSPECIFIED TYPE: ICD-10-CM

## 2020-01-10 DIAGNOSIS — I10 ESSENTIAL HYPERTENSION: ICD-10-CM

## 2020-01-10 PROCEDURE — 99214 OFFICE O/P EST MOD 30 MIN: CPT | Performed by: FAMILY MEDICINE

## 2020-01-10 PROCEDURE — 93000 ELECTROCARDIOGRAM COMPLETE: CPT | Performed by: FAMILY MEDICINE

## 2020-01-10 RX ORDER — LISINOPRIL 20 MG/1
20 TABLET ORAL DAILY
Qty: 30 TABLET | Refills: 1 | Status: SHIPPED | OUTPATIENT
Start: 2020-01-10 | End: 2020-02-07 | Stop reason: SINTOL

## 2020-01-10 RX ORDER — METOPROLOL SUCCINATE 50 MG/1
50 TABLET, EXTENDED RELEASE ORAL DAILY
Qty: 30 TABLET | Refills: 1 | Status: SHIPPED | OUTPATIENT
Start: 2020-01-10 | End: 2020-04-28

## 2020-01-10 NOTE — PROGRESS NOTES
"Subjective   Hayder Farley is a 29 y.o. male.     Chief Complaint   Patient presents with   • Hypertension     f/u       Visit Vitals  /86 (BP Location: Right arm, Cuff Size: Adult)   Pulse 100   Temp 98 °F (36.7 °C)   Ht 179.1 cm (70.5\")   Wt 101 kg (222 lb)   SpO2 98%   BMI 31.40 kg/m²       Wt Readings from Last 3 Encounters:   01/10/20 101 kg (222 lb)   12/04/19 102 kg (223 lb 12.8 oz)   11/01/19 100 kg (221 lb 3.2 oz)       Hypertension   This is a chronic problem. The current episode started more than 1 month ago. The problem has been waxing and waning since onset. The problem is uncontrolled. Associated symptoms include chest pain. Pertinent negatives include no anxiety, blurred vision, headaches, malaise/fatigue, neck pain, orthopnea, palpitations, peripheral edema, PND, shortness of breath or sweats. There are no associated agents to hypertension. Risk factors for coronary artery disease include male gender and obesity. Current antihypertension treatment includes beta blockers and ACE inhibitors. The current treatment provides moderate improvement. There are no compliance problems.  There is no history of angina, kidney disease, CAD/MI, CVA, heart failure, left ventricular hypertrophy, PVD or retinopathy. There is no history of sleep apnea or a thyroid problem.   Cough   This is a new problem. The current episode started 1 to 4 weeks ago. The problem has been gradually improving. The cough is non-productive. Associated symptoms include chest pain. Pertinent negatives include no chills, ear congestion, ear pain, eye redness, fever, headaches, heartburn, hemoptysis, myalgias, nasal congestion, postnasal drip, rash, rhinorrhea, sore throat, shortness of breath, sweats, weight loss or wheezing. Nothing aggravates the symptoms. He has tried nothing for the symptoms. The treatment provided moderate relief. His past medical history is significant for environmental allergies. There is no history of asthma, " bronchiectasis, bronchitis, COPD, emphysema or pneumonia.   Chest Pain    This is a new problem. The current episode started 1 to 4 weeks ago. The onset quality is undetermined. The problem occurs intermittently. The problem has been waxing and waning. The pain is present in the substernal region. The pain is at a severity of 2/10. The pain is mild. The quality of the pain is described as dull. The pain does not radiate. Associated symptoms include a cough. Pertinent negatives include no abdominal pain, back pain, claudication, diaphoresis, dizziness, exertional chest pressure, fever, headaches, hemoptysis, irregular heartbeat, leg pain, lower extremity edema, malaise/fatigue, nausea, near-syncope, numbness, orthopnea, palpitations, PND, shortness of breath, sputum production, syncope, vomiting or weakness. The cough has no precipitants. The cough is non-productive. There is no color change associated with the cough. Nothing relieves the cough. Nothing worsens the cough. The pain is aggravated by nothing. He has tried nothing for the symptoms. The treatment provided no relief. Risk factors include male gender.   His past medical history is significant for hypertension.   Pertinent negatives for past medical history include no aneurysm, no anxiety/panic attacks, no aortic aneurysm, no aortic dissection, no arrhythmia, no bicuspid aortic valve, no CAD, no cancer, no congenital heart disease, no connective tissue disease, no COPD, no CHF, no diabetes, no DVT, no hyperhomocysteinemia, no hyperlipidemia, no Kawasaki disease, no Marfan's syndrome, no MI, no mitral valve prolapse, no pacemaker, no PE, no PVD, no recent injury, no rheumatic fever, no seizures, no sickle cell disease, no sleep apnea, no spontaneous pneumothorax, no stimulant use, no strokes, no thyroid problem, no TIA, Stout syndrome and no valve disorder.   His family medical history is significant for CAD, heart disease, hyperlipidemia (dad) and early MI  "(uncle, and mgf).   Pertinent negatives for family medical history include: no aortic dissection, no connective tissue disease, no diabetes, no hypertension, no Marfan's syndrome, no PE, no PVD, no sickle cell disease, no stroke, no sudden death and no TIA.      Pt here for f/u of HTN. Pt has been checking BP at home and brings in his results which show most of blood pressure is elevated and some elevated pulse.       The following portions of the patient's history were reviewed and updated as appropriate: allergies, current medications, past family history, past medical history, past social history, past surgical history and problem list.    Past Medical History:   Diagnosis Date   • Allergic 1995    As a toddler   • Asthma    • Essential hypertension 11/1/2019   • Hypertension     STATES \"ITS WHITE COAT HYPERTENSION\"      Past Surgical History:   Procedure Laterality Date   • MOUTH SURGERY        Family History   Problem Relation Age of Onset   • Cancer Mother         Breast Cancer   • Breast cancer Mother    • Heart attack Maternal Grandfather    • Heart disease Maternal Grandfather    • Heart attack Paternal Grandfather    • Heart disease Paternal Grandfather    • Asthma Father    • Hyperlipidemia Father    • Heart disease Paternal Uncle    • Heart disease Maternal Grandmother    • Heart disease Paternal Grandmother       Social History     Socioeconomic History   • Marital status:      Spouse name: Not on file   • Number of children: Not on file   • Years of education: Not on file   • Highest education level: Not on file   Tobacco Use   • Smoking status: Never Smoker   • Smokeless tobacco: Never Used   Substance and Sexual Activity   • Alcohol use: No   • Drug use: No   • Sexual activity: Yes     Partners: Female     Birth control/protection: None      Allergies   Allergen Reactions   • Penicillins Other (See Comments)     As baby, pt does not know reaction       Review of Systems   Constitutional: " Negative.  Negative for chills, diaphoresis, fatigue, fever, malaise/fatigue and weight loss.   HENT: Negative.  Negative for ear pain, nosebleeds, postnasal drip, rhinorrhea, sinus pressure, sneezing and sore throat.    Eyes: Negative.  Negative for blurred vision, redness and itching.   Respiratory: Positive for cough. Negative for hemoptysis, sputum production, shortness of breath and wheezing.    Cardiovascular: Positive for chest pain. Negative for palpitations, orthopnea, claudication, leg swelling, syncope, PND and near-syncope.   Gastrointestinal: Negative.  Negative for abdominal pain, constipation, diarrhea, heartburn, nausea and vomiting.   Endocrine: Negative.  Negative for cold intolerance and heat intolerance.   Genitourinary: Negative.  Negative for dysuria, frequency, hematuria and urgency.   Musculoskeletal: Negative.  Negative for arthralgias, back pain, myalgias and neck pain.   Skin: Negative.  Negative for color change and rash.   Allergic/Immunologic: Positive for environmental allergies.   Neurological: Negative.  Negative for dizziness, seizures, syncope, weakness, light-headedness, numbness and headaches.   Hematological: Negative.  Negative for adenopathy. Does not bruise/bleed easily.   Psychiatric/Behavioral: Negative.  Negative for dysphoric mood. The patient is not nervous/anxious.        Objective   Physical Exam   Constitutional: He is oriented to person, place, and time. He appears well-developed.   HENT:   Head: Normocephalic.   Right Ear: External ear normal.   Left Ear: External ear normal.   Nose: Nose normal.   Eyes: Pupils are equal, round, and reactive to light. Conjunctivae, EOM and lids are normal.   Neck: Trachea normal and normal range of motion. Neck supple. Carotid bruit is not present. No thyroid mass and no thyromegaly present.   Cardiovascular: Normal rate and regular rhythm.   No murmur heard.  Pulmonary/Chest: Effort normal and breath sounds normal. No respiratory  distress. He has no decreased breath sounds. He has no wheezes. He has no rhonchi. He has no rales. He exhibits no tenderness.   Abdominal: Soft. Bowel sounds are normal. There is no tenderness.   Musculoskeletal: Normal range of motion.   Neurological: He is alert and oriented to person, place, and time.   Skin: Skin is warm and dry.   Psychiatric: He has a normal mood and affect. His behavior is normal.   Nursing note and vitals reviewed.        Assessment/Plan   Hayder was seen today for hypertension.    Diagnoses and all orders for this visit:    Abnormal glucose  -     Hemoglobin A1c    Essential hypertension  -     lisinopril (PRINIVIL,ZESTRIL) 20 MG tablet; Take 1 tablet by mouth Daily.  -     metoprolol succinate XL (TOPROL-XL) 50 MG 24 hr tablet; Take 1 tablet by mouth Daily.  -     Lipid Panel  -     Comprehensive Metabolic Panel    Family history of early CAD  -     ECG 12 Lead  -     Lipid Panel  -     Homocysteine  -     Ambulatory Referral to [facility]    Class 1 obesity with serious comorbidity and body mass index (BMI) of 31.0 to 31.9 in adult, unspecified obesity type    Chest pain, unspecified type  -     ECG 12 Lead  -     Ambulatory Referral to [facility] and [facility]        Pt advised if cough not improving, will discontinue lisinopril  Increase BP med metoprolol.           Current Outpatient Medications:   •  cetirizine (ZYRTEC ALLERGY) 10 MG tablet, Zyrtec 10 mg tablet  Daily, Disp: , Rfl:   •  lisinopril (PRINIVIL,ZESTRIL) 20 MG tablet, Take 1 tablet by mouth Daily., Disp: 30 tablet, Rfl: 1  •  montelukast (SINGULAIR) 10 MG tablet, Take 1 tablet by mouth Every Night., Disp: 30 tablet, Rfl: 5  •  albuterol sulfate  (90 Base) MCG/ACT inhaler, Inhale 2 puffs Every 4 (Four) Hours As Needed for Wheezing., Disp: 18 g, Rfl: 3  •  metoprolol succinate XL (TOPROL-XL) 50 MG 24 hr tablet, Take 1 tablet by mouth Daily., Disp: 30 tablet, Rfl:  1    Return in about 4 weeks (around 2/7/2020), or if symptoms worsen or fail to improve, for Recheck.      ECG 12 Lead  Date/Time: 1/10/2020 8:41 AM  Performed by: Conchis Warner MD  Authorized by: Conchis Warner MD   Comparison: not compared with previous ECG   Previous ECG: no previous ECG available  Rhythm: sinus rhythm  Rate: normal  BPM: 84  Conduction: conduction normal  ST Segments: ST segments normal  QRS axis: normal (P, R, T: 62, 0, 46)  Other: no other findings    Clinical impression: normal ECG  Comments: PT int 138 ms  QRS dur 104 ms  QT/QTc 353/394 ms

## 2020-01-11 LAB
ALBUMIN SERPL-MCNC: 4.7 G/DL (ref 3.5–5.2)
ALBUMIN/GLOB SERPL: 2 G/DL
ALP SERPL-CCNC: 86 U/L (ref 39–117)
ALT SERPL-CCNC: 24 U/L (ref 1–41)
AST SERPL-CCNC: 18 U/L (ref 1–40)
BILIRUB SERPL-MCNC: 0.4 MG/DL (ref 0.2–1.2)
BUN SERPL-MCNC: 14 MG/DL (ref 6–20)
BUN/CREAT SERPL: 15.7 (ref 7–25)
CALCIUM SERPL-MCNC: 9.5 MG/DL (ref 8.6–10.5)
CHLORIDE SERPL-SCNC: 101 MMOL/L (ref 98–107)
CHOLEST SERPL-MCNC: 204 MG/DL (ref 0–200)
CO2 SERPL-SCNC: 25 MMOL/L (ref 22–29)
CREAT SERPL-MCNC: 0.89 MG/DL (ref 0.76–1.27)
GLOBULIN SER CALC-MCNC: 2.3 GM/DL
GLUCOSE SERPL-MCNC: 109 MG/DL (ref 65–99)
HBA1C MFR BLD: 5.5 % (ref 4.8–5.6)
HCYS SERPL-SCNC: 10 UMOL/L (ref 0–15)
HDLC SERPL-MCNC: 39 MG/DL (ref 40–60)
LDLC SERPL CALC-MCNC: 145 MG/DL (ref 0–100)
POTASSIUM SERPL-SCNC: 4.8 MMOL/L (ref 3.5–5.2)
PROT SERPL-MCNC: 7 G/DL (ref 6–8.5)
SODIUM SERPL-SCNC: 140 MMOL/L (ref 136–145)
TRIGL SERPL-MCNC: 102 MG/DL (ref 0–150)
VLDLC SERPL CALC-MCNC: 20.4 MG/DL

## 2020-01-12 PROBLEM — E78.5 HYPERLIPIDEMIA LDL GOAL <100: Status: ACTIVE | Noted: 2020-01-12

## 2020-01-13 ENCOUNTER — TELEPHONE (OUTPATIENT)
Dept: INTERNAL MEDICINE | Facility: CLINIC | Age: 30
End: 2020-01-13

## 2020-01-13 NOTE — TELEPHONE ENCOUNTER
----- Message from Conchis GUILLEN MD sent at 1/12/2020  6:27 PM EST -----  Elevated cholesterol, if following low animal fat, low sugar, low alcohol diet, then add statins

## 2020-01-16 NOTE — PROGRESS NOTES
"[facility]  [facility]      01/17/2020       Hayder Farley  [address] [facility] [address]  [unfilled]    1990    Conchis Warner MD    Hayder Farley is a 29 y.o. male.      Subjective:     Chief Complaint:  Chest Pain and Establish Care       HPI   Patient is a 29-year-old male past medical history significant for hypertension, asthma and hyperlipidemia who presents to the chest pain clinic at the request of Dr. Warner.  Patient reports intermittent chest pain over the past couple of months.  The pain is located in the substernal region and does not radiate.  Describes the discomfort as dull.  Associated dry cough. Worsened sometime with inspiration. He is not sure if it is related to food intake. He reports that he recently established care with PCP and diagnosed with high blood pressure. He was started on metoprolol and lisinopril. Evaluated by nephrologist when he was young. He brings in his BP log and most BPs are around 110-130. He has calibrated his device with his PCPs cuff. Resting HRs high normal between .    Cardiac risks: HTN, hyperlipidemia, gender  Some family hx of CAD in his paternal and maternal family but not immediate family    Patient Active Problem List   Diagnosis   • Moderate persistent asthma without complication   • Essential hypertension   • Hyperlipidemia LDL goal <100       Past Medical History:   Diagnosis Date   • Allergic 1995    As a toddler   • Asthma    • Essential hypertension 11/1/2019   • Hyperlipidemia LDL goal <100 1/12/2020   • Hypertension     STATES \"ITS WHITE COAT HYPERTENSION\"       Past Surgical History:   Procedure Laterality Date   • MOUTH SURGERY         Family History   Problem Relation Age of Onset   • Cancer Mother         Breast Cancer   • Breast cancer Mother    • Heart attack Maternal Grandfather    • Heart disease Maternal Grandfather    • Heart attack Paternal Grandfather    • Heart disease Paternal Grandfather  "   • Asthma Father    • Hyperlipidemia Father    • Heart disease Paternal Uncle    • Heart disease Maternal Grandmother    • Heart disease Paternal Grandmother        Social History     Socioeconomic History   • Marital status:      Spouse name: Not on file   • Number of children: Not on file   • Years of education: Not on file   • Highest education level: Not on file   Tobacco Use   • Smoking status: Never Smoker   • Smokeless tobacco: Never Used   Substance and Sexual Activity   • Alcohol use: No   • Drug use: No   • Sexual activity: Yes     Partners: Female     Birth control/protection: None   Social History Narrative    Caffeine coffee  2 servings a day       Allergies   Allergen Reactions   • Penicillins Other (See Comments)     As baby, pt does not know reaction         Current Outpatient Medications:   •  albuterol sulfate  (90 Base) MCG/ACT inhaler, Inhale 2 puffs Every 4 (Four) Hours As Needed for Wheezing., Disp: 18 g, Rfl: 3  •  cetirizine (ZYRTEC ALLERGY) 10 MG tablet, Zyrtec 10 mg tablet  Daily, Disp: , Rfl:   •  lisinopril (PRINIVIL,ZESTRIL) 20 MG tablet, Take 1 tablet by mouth Daily., Disp: 30 tablet, Rfl: 1  •  metoprolol succinate XL (TOPROL-XL) 50 MG 24 hr tablet, Take 1 tablet by mouth Daily., Disp: 30 tablet, Rfl: 1  •  montelukast (SINGULAIR) 10 MG tablet, Take 1 tablet by mouth Every Night., Disp: 30 tablet, Rfl: 5    The following portions of the patient's history were reviewed today and updated as appropriate: allergies, current medications, past family history, past medical history, past social history, past surgical history and problem list     Review of Systems   Constitution: Negative for chills, fever and malaise/fatigue.   HENT: Negative.    Eyes: Negative.  Negative for blurred vision.   Cardiovascular: Positive for chest pain. Negative for claudication, cyanosis, dyspnea on exertion, irregular heartbeat, leg swelling, near-syncope, orthopnea, palpitations, paroxysmal  "nocturnal dyspnea and syncope.   Respiratory: Positive for wheezing. Negative for cough, shortness of breath and snoring.    Endocrine: Negative.    Hematologic/Lymphatic: Does not bruise/bleed easily.   Skin: Negative for poor wound healing.   Musculoskeletal: Negative.  Negative for neck pain.   Gastrointestinal: Negative for abdominal pain, heartburn, hematemesis, melena, nausea and vomiting.   Genitourinary: Negative.  Negative for hematuria.   Neurological: Negative.  Negative for headaches.   Psychiatric/Behavioral: Negative.    Allergic/Immunologic: Negative.        Objective:     Vitals:    01/17/20 0900 01/17/20 0904 01/17/20 0905   BP: 144/90 146/88 143/85   BP Location: Left arm Right arm Right arm   Patient Position: Sitting Sitting Standing   Cuff Size: Adult Adult Adult   Pulse: 118  106   Resp: 24     Temp: 97.2 °F (36.2 °C)     SpO2: 97%  97%   Weight: 101 kg (222 lb 6 oz)     Height: 179.1 cm (70.5\")         Body mass index is 31.46 kg/m².    Physical Exam   Constitutional: He is oriented to person, place, and time. He appears well-developed and well-nourished. No distress.   HENT:   Head: Normocephalic.   Eyes: Pupils are equal, round, and reactive to light. Conjunctivae are normal.   Neck: Neck supple. No JVD present. No thyromegaly present.   Cardiovascular: Normal rate, regular rhythm, normal heart sounds and intact distal pulses. Exam reveals no gallop and no friction rub.   No murmur heard.  Pulmonary/Chest: Effort normal and breath sounds normal. No respiratory distress. He has no wheezes. He has no rales. He exhibits no tenderness.   Abdominal: Soft. Bowel sounds are normal.   Musculoskeletal: Normal range of motion. He exhibits no edema.   Neurological: He is alert and oriented to person, place, and time.   Skin: Skin is warm and dry.   Psychiatric: He has a normal mood and affect. His behavior is normal. Thought content normal.   Vitals reviewed.      Lab and Diagnostic Review:  Results " for orders placed or performed in visit on 01/10/20   Lipid Panel   Result Value Ref Range    Total Cholesterol 204 (H) 0 - 200 mg/dL    Triglycerides 102 0 - 150 mg/dL    HDL Cholesterol 39 (L) 40 - 60 mg/dL    VLDL Cholesterol 20.4 mg/dL    LDL Cholesterol  145 (H) 0 - 100 mg/dL   Hemoglobin A1c   Result Value Ref Range    Hemoglobin A1C 5.50 4.80 - 5.60 %   Homocysteine   Result Value Ref Range    Homocystine, Plasma (Quant) 10.0 0.0 - 15.0 umol/L   Comprehensive Metabolic Panel   Result Value Ref Range    Glucose 109 (H) 65 - 99 mg/dL    BUN 14 6 - 20 mg/dL    Creatinine 0.89 0.76 - 1.27 mg/dL    eGFR Non African Am 100 >60 mL/min/1.73    eGFR African Am 122 >60 mL/min/1.73    BUN/Creatinine Ratio 15.7 7.0 - 25.0    Sodium 140 136 - 145 mmol/L    Potassium 4.8 3.5 - 5.2 mmol/L    Chloride 101 98 - 107 mmol/L    Total CO2 25.0 22.0 - 29.0 mmol/L    Calcium 9.5 8.6 - 10.5 mg/dL    Total Protein 7.0 6.0 - 8.5 g/dL    Albumin 4.70 3.50 - 5.20 g/dL    Globulin 2.3 gm/dL    A/G Ratio 2.0 g/dL    Total Bilirubin 0.4 0.2 - 1.2 mg/dL    Alkaline Phosphatase 86 39 - 117 U/L    AST (SGOT) 18 1 - 40 U/L    ALT (SGPT) 24 1 - 41 U/L     EKG 1/10/20 NSR    Assessment and Plan:   1. Chest pain, unspecified type  Overall low risk due to age but does have some cardiac risks  - Treadmill Stress Test; Future  - Adult Transthoracic Echo Complete W/ Cont if Necessary Per Protocol; Future    2. Essential hypertension  Controlled  - Treadmill Stress Test; Future  - Adult Transthoracic Echo Complete W/ Cont if Necessary Per Protocol; Future    3. LAKHANI (dyspnea on exertion)  - Treadmill Stress Test; Future  - Adult Transthoracic Echo Complete W/ Cont if Necessary Per Protocol; Future    4. Tachycardia  He is asymptomatic, likely inappropriate sinus tachycardia vs anxiety induced. I advised him to contact me with any changes in symptoms and will consider extended holter monitoring  - Treadmill Stress Test; Future  - Adult Transthoracic  Echo Complete W/ Cont if Necessary Per Protocol; Future    5. Hyperlipidemia  Recommend healthy diet and exercise, which he is in the process of doing. Continue to monitor, but no need for statin at this time    Further plans pending testing       It has been a pleasure to participate in the care of this patient.  Patient was instructed to call the [facility] with any questions, concerns, or worsening symptoms.    *Please note that portions of this note were completed with a voice recognition program. Efforts were made to edit the dictations, but occasionally words are mistranscribed.

## 2020-01-17 ENCOUNTER — OFFICE VISIT (OUTPATIENT)
Dept: CARDIOLOGY | Facility: HOSPITAL | Age: 30
End: 2020-01-17

## 2020-01-17 VITALS
HEIGHT: 71 IN | BODY MASS INDEX: 31.13 KG/M2 | HEART RATE: 106 BPM | WEIGHT: 222.38 LBS | SYSTOLIC BLOOD PRESSURE: 143 MMHG | OXYGEN SATURATION: 97 % | RESPIRATION RATE: 24 BRPM | DIASTOLIC BLOOD PRESSURE: 85 MMHG | TEMPERATURE: 97.2 F

## 2020-01-17 DIAGNOSIS — R07.9 CHEST PAIN, UNSPECIFIED TYPE: Primary | ICD-10-CM

## 2020-01-17 DIAGNOSIS — R06.09 DOE (DYSPNEA ON EXERTION): ICD-10-CM

## 2020-01-17 DIAGNOSIS — I10 ESSENTIAL HYPERTENSION: ICD-10-CM

## 2020-01-17 DIAGNOSIS — E78.5 HYPERLIPIDEMIA, UNSPECIFIED HYPERLIPIDEMIA TYPE: ICD-10-CM

## 2020-01-17 DIAGNOSIS — R00.0 TACHYCARDIA: ICD-10-CM

## 2020-01-17 PROCEDURE — 99214 OFFICE O/P EST MOD 30 MIN: CPT | Performed by: NURSE PRACTITIONER

## 2020-01-24 ENCOUNTER — HOSPITAL ENCOUNTER (OUTPATIENT)
Dept: CARDIOLOGY | Facility: HOSPITAL | Age: 30
Discharge: HOME | End: 2020-01-24

## 2020-01-24 ENCOUNTER — HOSPITAL ENCOUNTER (OUTPATIENT)
Dept: CARDIOLOGY | Facility: HOSPITAL | Age: 30
Discharge: HOME | End: 2020-01-24
Admitting: NURSE PRACTITIONER

## 2020-01-24 VITALS — HEIGHT: 71 IN | WEIGHT: 222 LBS | BODY MASS INDEX: 31.08 KG/M2

## 2020-01-24 DIAGNOSIS — R00.0 TACHYCARDIA: ICD-10-CM

## 2020-01-24 DIAGNOSIS — I10 ESSENTIAL HYPERTENSION: ICD-10-CM

## 2020-01-24 DIAGNOSIS — R06.09 DOE (DYSPNEA ON EXERTION): ICD-10-CM

## 2020-01-24 DIAGNOSIS — R07.9 CHEST PAIN, UNSPECIFIED TYPE: ICD-10-CM

## 2020-01-24 LAB
BH CV ECHO MEAS - AO MAX PG (FULL): 2.4 MMHG
BH CV ECHO MEAS - AO MAX PG: 7.8 MMHG
BH CV ECHO MEAS - AO ROOT AREA (BSA CORRECTED): 1.4
BH CV ECHO MEAS - AO ROOT AREA: 7.1 CM^2
BH CV ECHO MEAS - AO ROOT DIAM: 3 CM
BH CV ECHO MEAS - AO V2 MAX: 140 CM/SEC
BH CV ECHO MEAS - AVA(V,A): 2.9 CM^2
BH CV ECHO MEAS - AVA(V,D): 2.9 CM^2
BH CV ECHO MEAS - BSA(HAYCOCK): 2.3 M^2
BH CV ECHO MEAS - BSA: 2.2 M^2
BH CV ECHO MEAS - BZI_BMI: 31.9 KILOGRAMS/M^2
BH CV ECHO MEAS - BZI_METRIC_HEIGHT: 177.8 CM
BH CV ECHO MEAS - BZI_METRIC_WEIGHT: 100.7 KG
BH CV ECHO MEAS - EDV(CUBED): 120.9 ML
BH CV ECHO MEAS - EDV(MOD-SP2): 58 ML
BH CV ECHO MEAS - EDV(MOD-SP4): 70 ML
BH CV ECHO MEAS - EDV(TEICH): 115.2 ML
BH CV ECHO MEAS - EF(CUBED): 73.7 %
BH CV ECHO MEAS - EF(MOD-BP): 56 %
BH CV ECHO MEAS - EF(MOD-SP2): 58.6 %
BH CV ECHO MEAS - EF(MOD-SP4): 55.7 %
BH CV ECHO MEAS - EF(TEICH): 65.3 %
BH CV ECHO MEAS - ESV(CUBED): 31.9 ML
BH CV ECHO MEAS - ESV(MOD-SP2): 24 ML
BH CV ECHO MEAS - ESV(MOD-SP4): 31 ML
BH CV ECHO MEAS - ESV(TEICH): 40 ML
BH CV ECHO MEAS - FS: 35.9 %
BH CV ECHO MEAS - IVS/LVPW: 0.92
BH CV ECHO MEAS - IVSD: 0.7 CM
BH CV ECHO MEAS - LA DIMENSION: 2.8 CM
BH CV ECHO MEAS - LA/AO: 0.93
BH CV ECHO MEAS - LAD MAJOR: 5.1 CM
BH CV ECHO MEAS - LAT PEAK E' VEL: 17.7 CM/SEC
BH CV ECHO MEAS - LATERAL E/E' RATIO: 4.7
BH CV ECHO MEAS - LV DIASTOLIC VOL/BSA (35-75): 32.1 ML/M^2
BH CV ECHO MEAS - LV MASS(C)D: 118 GRAMS
BH CV ECHO MEAS - LV MASS(C)DI: 54.1 GRAMS/M^2
BH CV ECHO MEAS - LV MAX PG: 5.5 MMHG
BH CV ECHO MEAS - LV MEAN PG: 3 MMHG
BH CV ECHO MEAS - LV SYSTOLIC VOL/BSA (12-30): 14.2 ML/M^2
BH CV ECHO MEAS - LV V1 MAX: 117 CM/SEC
BH CV ECHO MEAS - LV V1 MEAN: 79.3 CM/SEC
BH CV ECHO MEAS - LV V1 VTI: 18 CM
BH CV ECHO MEAS - LVIDD: 4.9 CM
BH CV ECHO MEAS - LVIDS: 3.2 CM
BH CV ECHO MEAS - LVLD AP2: 7.9 CM
BH CV ECHO MEAS - LVLD AP4: 7.7 CM
BH CV ECHO MEAS - LVLS AP2: 7.1 CM
BH CV ECHO MEAS - LVLS AP4: 6.8 CM
BH CV ECHO MEAS - LVOT AREA (M): 3.5 CM^2
BH CV ECHO MEAS - LVOT AREA: 3.5 CM^2
BH CV ECHO MEAS - LVOT DIAM: 2.1 CM
BH CV ECHO MEAS - LVPWD: 0.76 CM
BH CV ECHO MEAS - MED PEAK E' VEL: 16.3 CM/SEC
BH CV ECHO MEAS - MEDIAL E/E' RATIO: 5.1
BH CV ECHO MEAS - MV A MAX VEL: 91.6 CM/SEC
BH CV ECHO MEAS - MV DEC TIME: 0.06 SEC
BH CV ECHO MEAS - MV E MAX VEL: 83.9 CM/SEC
BH CV ECHO MEAS - MV E/A: 0.92
BH CV ECHO MEAS - PA ACC SLOPE: 528 CM/SEC^2
BH CV ECHO MEAS - PA ACC TIME: 0.12 SEC
BH CV ECHO MEAS - PA MAX PG: 4.6 MMHG
BH CV ECHO MEAS - PA PR(ACCEL): 25 MMHG
BH CV ECHO MEAS - PA V2 MAX: 107.5 CM/SEC
BH CV ECHO MEAS - SI(CUBED): 40.8 ML/M^2
BH CV ECHO MEAS - SI(LVOT): 28.6 ML/M^2
BH CV ECHO MEAS - SI(MOD-SP2): 15.6 ML/M^2
BH CV ECHO MEAS - SI(MOD-SP4): 17.9 ML/M^2
BH CV ECHO MEAS - SI(TEICH): 34.5 ML/M^2
BH CV ECHO MEAS - SV(CUBED): 89.1 ML
BH CV ECHO MEAS - SV(LVOT): 62.3 ML
BH CV ECHO MEAS - SV(MOD-SP2): 34 ML
BH CV ECHO MEAS - SV(MOD-SP4): 39 ML
BH CV ECHO MEAS - SV(TEICH): 75.2 ML
BH CV ECHO MEAS - TAPSE (>1.6): 2.1 CM2
BH CV ECHO MEASUREMENTS AVERAGE E/E' RATIO: 4.94
BH CV STRESS BP STAGE 1: NORMAL
BH CV STRESS BP STAGE 2: NORMAL
BH CV STRESS BP STAGE 3: NORMAL
BH CV STRESS DURATION MIN STAGE 1: 3
BH CV STRESS DURATION MIN STAGE 2: 3
BH CV STRESS DURATION MIN STAGE 3: 3
BH CV STRESS DURATION MIN STAGE 4: 2
BH CV STRESS DURATION SEC STAGE 1: 0
BH CV STRESS DURATION SEC STAGE 2: 0
BH CV STRESS DURATION SEC STAGE 3: 0
BH CV STRESS DURATION SEC STAGE 4: 22
BH CV STRESS GRADE STAGE 1: 10
BH CV STRESS GRADE STAGE 2: 12
BH CV STRESS GRADE STAGE 3: 14
BH CV STRESS GRADE STAGE 4: 16
BH CV STRESS HR STAGE 1: 146
BH CV STRESS HR STAGE 2: 171
BH CV STRESS HR STAGE 3: 196
BH CV STRESS HR STAGE 4: 210
BH CV STRESS METS STAGE 1: 5
BH CV STRESS METS STAGE 2: 7.5
BH CV STRESS METS STAGE 3: 10
BH CV STRESS METS STAGE 4: 13.5
BH CV STRESS PROTOCOL 1: NORMAL
BH CV STRESS RECOVERY BP: NORMAL MMHG
BH CV STRESS RECOVERY HR: 210 BPM
BH CV STRESS SPEED STAGE 1: 1.7
BH CV STRESS SPEED STAGE 2: 2.5
BH CV STRESS SPEED STAGE 3: 3.4
BH CV STRESS SPEED STAGE 4: 4.2
BH CV STRESS STAGE 1: 1
BH CV STRESS STAGE 2: 2
BH CV STRESS STAGE 3: 3
BH CV STRESS STAGE 4: 4
BH CV VAS BP LEFT ARM: NORMAL MMHG
BH CV XLRA - RV BASE: 2.9 CM
BH CV XLRA - RV LENGTH: 7.7 CM
BH CV XLRA - RV MID: 2.4 CM
BH CV XLRA - TDI S': 11.7 CM/SEC
LEFT ATRIUM VOLUME INDEX: 21.5 ML/M^2
LEFT ATRIUM VOLUME: 47 ML
MAXIMAL PREDICTED HEART RATE: 190 BPM
MAXIMAL PREDICTED HEART RATE: 190 BPM
PERCENT MAX PREDICTED HR: 110.53 %
STRESS BASELINE BP: NORMAL MMHG
STRESS BASELINE HR: 121 BPM
STRESS PERCENT HR: 130 %
STRESS POST ESTIMATED WORKLOAD: 13.7 METS
STRESS POST EXERCISE DUR MIN: 11 MIN
STRESS POST EXERCISE DUR SEC: 22 SEC
STRESS POST PEAK BP: NORMAL MMHG
STRESS POST PEAK HR: 210 BPM
STRESS TARGET HR: 162 BPM
STRESS TARGET HR: 162 BPM

## 2020-01-24 PROCEDURE — 93306 TTE W/DOPPLER COMPLETE: CPT | Performed by: INTERNAL MEDICINE

## 2020-01-24 PROCEDURE — 93018 CV STRESS TEST I&R ONLY: CPT | Performed by: INTERNAL MEDICINE

## 2020-01-24 PROCEDURE — 93017 CV STRESS TEST TRACING ONLY: CPT

## 2020-01-24 PROCEDURE — 93306 TTE W/DOPPLER COMPLETE: CPT

## 2020-01-31 DIAGNOSIS — I10 ESSENTIAL HYPERTENSION: ICD-10-CM

## 2020-01-31 RX ORDER — METOPROLOL SUCCINATE 25 MG/1
TABLET, EXTENDED RELEASE ORAL
Qty: 30 TABLET | Refills: 0 | OUTPATIENT
Start: 2020-01-31

## 2020-02-07 ENCOUNTER — OFFICE VISIT (OUTPATIENT)
Dept: INTERNAL MEDICINE | Facility: CLINIC | Age: 30
End: 2020-02-07

## 2020-02-07 VITALS
HEART RATE: 112 BPM | BODY MASS INDEX: 30.77 KG/M2 | TEMPERATURE: 97.8 F | DIASTOLIC BLOOD PRESSURE: 88 MMHG | HEIGHT: 71 IN | SYSTOLIC BLOOD PRESSURE: 110 MMHG | WEIGHT: 219.8 LBS | OXYGEN SATURATION: 98 %

## 2020-02-07 DIAGNOSIS — I10 ESSENTIAL HYPERTENSION: Primary | ICD-10-CM

## 2020-02-07 DIAGNOSIS — E66.9 CLASS 1 OBESITY WITH SERIOUS COMORBIDITY AND BODY MASS INDEX (BMI) OF 31.0 TO 31.9 IN ADULT, UNSPECIFIED OBESITY TYPE: ICD-10-CM

## 2020-02-07 PROCEDURE — 99213 OFFICE O/P EST LOW 20 MIN: CPT | Performed by: FAMILY MEDICINE

## 2020-02-07 RX ORDER — LOSARTAN POTASSIUM 50 MG/1
50 TABLET ORAL DAILY
Qty: 30 TABLET | Refills: 1 | Status: SHIPPED | OUTPATIENT
Start: 2020-02-07 | End: 2020-04-13

## 2020-02-07 NOTE — PROGRESS NOTES
"Subjective   Hayder Farley is a 29 y.o. male.     Chief Complaint   Patient presents with   • Hypertension     f/u       Visit Vitals  /88 (BP Location: Left arm, Cuff Size: Large Adult)   Pulse 112   Temp 97.8 °F (36.6 °C)   Ht 179.1 cm (70.51\")   Wt 99.7 kg (219 lb 12.8 oz)   SpO2 98%   BMI 31.08 kg/m²       Wt Readings from Last 3 Encounters:   02/07/20 99.7 kg (219 lb 12.8 oz)   01/24/20 101 kg (222 lb)   01/17/20 101 kg (222 lb 6 oz)       Hypertension   This is a chronic problem. The current episode started more than 1 month ago. The problem is unchanged. The problem is controlled. Pertinent negatives include no anxiety, blurred vision, chest pain, headaches, malaise/fatigue, neck pain, orthopnea, palpitations, peripheral edema, PND, shortness of breath or sweats. Risk factors for coronary artery disease include dyslipidemia, family history and male gender. Past treatments include ACE inhibitors and beta blockers. Current antihypertension treatment includes ACE inhibitors and beta blockers. The current treatment provides significant improvement. Compliance problems include medication side effects.  There is no history of sleep apnea or a thyroid problem.      Pt has stress test and echo since last seen which were acceptable.  Pt has been having a cough since on lisinopril.    Pt has been a little more wheezy since on metoprolol.  Pt has increased fiber and has increased his exercise since last visit.     The following portions of the patient's history were reviewed and updated as appropriate: allergies, current medications, past family history, past medical history, past social history, past surgical history and problem list.    Past Medical History:   Diagnosis Date   • Allergic 1995    As a toddler   • Asthma    • Essential hypertension 11/1/2019   • Hyperlipidemia LDL goal <100 1/12/2020   • Hypertension     STATES \"ITS WHITE COAT HYPERTENSION\"      Past Surgical History:   Procedure Laterality " Date   • MOUTH SURGERY        Family History   Problem Relation Age of Onset   • Cancer Mother         Breast Cancer   • Breast cancer Mother    • Heart attack Maternal Grandfather    • Heart disease Maternal Grandfather    • Heart attack Paternal Grandfather    • Heart disease Paternal Grandfather    • Asthma Father    • Hyperlipidemia Father    • Heart disease Paternal Uncle    • Heart disease Maternal Grandmother    • Heart disease Paternal Grandmother       Social History     Socioeconomic History   • Marital status:      Spouse name: Not on file   • Number of children: Not on file   • Years of education: Not on file   • Highest education level: Not on file   Tobacco Use   • Smoking status: Never Smoker   • Smokeless tobacco: Never Used   Substance and Sexual Activity   • Alcohol use: No   • Drug use: No   • Sexual activity: Yes     Partners: Female     Birth control/protection: None   Social History Narrative    Caffeine coffee  2 servings a day      Allergies   Allergen Reactions   • Lisinopril Cough   • Penicillins Other (See Comments)     As baby, pt does not know reaction       Review of Systems   Constitutional: Negative.  Negative for chills, diaphoresis, fatigue, fever and malaise/fatigue.   HENT: Negative.  Negative for ear pain, nosebleeds, postnasal drip, rhinorrhea, sinus pressure, sneezing and sore throat.    Eyes: Negative.  Negative for blurred vision, redness and itching.   Respiratory: Positive for cough and wheezing. Negative for shortness of breath.    Cardiovascular: Negative.  Negative for chest pain, palpitations, orthopnea and PND.   Gastrointestinal: Negative.  Negative for abdominal pain, constipation, diarrhea, nausea and vomiting.   Endocrine: Negative.  Negative for cold intolerance and heat intolerance.   Genitourinary: Negative.  Negative for dysuria, frequency, hematuria and urgency.   Musculoskeletal: Negative.  Negative for arthralgias, back pain and neck pain.   Skin:  Negative.  Negative for color change and rash.   Allergic/Immunologic: Positive for environmental allergies.   Neurological: Negative.  Negative for dizziness, syncope, light-headedness and headaches.   Hematological: Negative.  Negative for adenopathy. Does not bruise/bleed easily.   Psychiatric/Behavioral: Negative.  Negative for dysphoric mood. The patient is not nervous/anxious.        Objective   Physical Exam   Constitutional: He is oriented to person, place, and time. He appears well-developed.   HENT:   Head: Normocephalic.   Right Ear: External ear normal.   Left Ear: External ear normal.   Nose: Nose normal.   Eyes: Pupils are equal, round, and reactive to light. Conjunctivae, EOM and lids are normal.   Neck: Trachea normal and normal range of motion. Neck supple. Carotid bruit is not present. No thyroid mass and no thyromegaly present.   Cardiovascular: Regular rhythm. Tachycardia present.   No murmur heard.  Pulmonary/Chest: Effort normal and breath sounds normal. No respiratory distress. He has no decreased breath sounds. He has no wheezes. He has no rhonchi. He has no rales. He exhibits no tenderness.   Abdominal: Soft. Bowel sounds are normal. There is no tenderness.   Musculoskeletal: Normal range of motion.   Neurological: He is alert and oriented to person, place, and time.   Skin: Skin is warm and dry.   Psychiatric: He has a normal mood and affect. His behavior is normal.   Nursing note and vitals reviewed.      Assessment/Plan   Hayder was seen today for hypertension.    Diagnoses and all orders for this visit:    Essential hypertension  -     losartan (COZAAR) 50 MG tablet; Take 1 tablet by mouth Daily.    Class 1 obesity with serious comorbidity and body mass index (BMI) of 31.0 to 31.9 in adult, unspecified obesity type          Change lisinopril to losartan. If good control with losartan will decrease metoprolol     Please follow a low animal fat diet that is also low in sugar, low in junk  food, low in sweet drinks and low in alcohol.  Please increase the amount of fiber in your diet as well as increasing your daily exercise, such as walking.      Current Outpatient Medications:   •  albuterol sulfate  (90 Base) MCG/ACT inhaler, Inhale 2 puffs Every 4 (Four) Hours As Needed for Wheezing., Disp: 18 g, Rfl: 3  •  cetirizine (ZYRTEC ALLERGY) 10 MG tablet, Zyrtec 10 mg tablet  Daily, Disp: , Rfl:   •  metoprolol succinate XL (TOPROL-XL) 50 MG 24 hr tablet, Take 1 tablet by mouth Daily., Disp: 30 tablet, Rfl: 1  •  montelukast (SINGULAIR) 10 MG tablet, Take 1 tablet by mouth Every Night., Disp: 30 tablet, Rfl: 5  •  losartan (COZAAR) 50 MG tablet, Take 1 tablet by mouth Daily., Disp: 30 tablet, Rfl: 1    Return in about 3 weeks (around 2/28/2020), or if symptoms worsen or fail to improve, for Recheck.    Recent Results (from the past 1680 hour(s))   Lipid Panel    Collection Time: 01/10/20  8:54 AM   Result Value Ref Range    Total Cholesterol 204 (H) 0 - 200 mg/dL    Triglycerides 102 0 - 150 mg/dL    HDL Cholesterol 39 (L) 40 - 60 mg/dL    VLDL Cholesterol 20.4 mg/dL    LDL Cholesterol  145 (H) 0 - 100 mg/dL   Hemoglobin A1c    Collection Time: 01/10/20  8:54 AM   Result Value Ref Range    Hemoglobin A1C 5.50 4.80 - 5.60 %   Homocysteine    Collection Time: 01/10/20  8:54 AM   Result Value Ref Range    Homocystine, Plasma (Quant) 10.0 0.0 - 15.0 umol/L   Comprehensive Metabolic Panel    Collection Time: 01/10/20  8:54 AM   Result Value Ref Range    Glucose 109 (H) 65 - 99 mg/dL    BUN 14 6 - 20 mg/dL    Creatinine 0.89 0.76 - 1.27 mg/dL    eGFR Non African Am 100 >60 mL/min/1.73    eGFR African Am 122 >60 mL/min/1.73    BUN/Creatinine Ratio 15.7 7.0 - 25.0    Sodium 140 136 - 145 mmol/L    Potassium 4.8 3.5 - 5.2 mmol/L    Chloride 101 98 - 107 mmol/L    Total CO2 25.0 22.0 - 29.0 mmol/L    Calcium 9.5 8.6 - 10.5 mg/dL    Total Protein 7.0 6.0 - 8.5 g/dL    Albumin 4.70 3.50 - 5.20 g/dL     Globulin 2.3 gm/dL    A/G Ratio 2.0 g/dL    Total Bilirubin 0.4 0.2 - 1.2 mg/dL    Alkaline Phosphatase 86 39 - 117 U/L    AST (SGOT) 18 1 - 40 U/L    ALT (SGPT) 24 1 - 41 U/L   Treadmill Stress Test    Collection Time: 01/24/20  3:23 PM   Result Value Ref Range    BH CV STRESS PROTOCOL 1 Chucky     Stage 1 1     HR Stage 1 146     BP Stage 1 148/82     Duration Min Stage 1 3     Duration Sec Stage 1 0     Grade Stage 1 10     Speed Stage 1 1.7     BH CV STRESS METS STAGE 1 5     Stage 2 2     HR Stage 2 171     BP Stage 2 188/80     Duration Min Stage 2 3     Duration Sec Stage 2 0     Grade Stage 2 12     Speed Stage 2 2.5     BH CV STRESS METS STAGE 2 7.5     Stage 3 3     HR Stage 3 196     BP Stage 3 192/84     Duration Min Stage 3 3     Duration Sec Stage 3 0     Grade Stage 3 14     Speed Stage 3 3.4     BH CV STRESS METS STAGE 3 10.0     Stage 4 4     HR Stage 4 210     Duration Min Stage 4 2     Duration Sec Stage 4 22     Grade Stage 4 16     Speed Stage 4 4.2     BH CV STRESS METS STAGE 4 13.5     Baseline  bpm    Baseline /84 mmHg    Peak  bpm    Percent Max Pred .53 %    Percent Target  %    Peak /84 mmHg    Recovery  bpm    Recovery /80 mmHg    Target HR (85%) 162 bpm    Max. Pred. HR (100%) 190 bpm    Exercise duration (min) 11 min    Exercise duration (sec) 22 sec    Estimated workload 13.7 METS   Adult Transthoracic Echo Complete W/ Cont if Necessary Per Protocol    Collection Time: 01/24/20  4:02 PM   Result Value Ref Range    BSA 2.2 m^2    IVSd 0.7 cm    LVIDd 4.9 cm    LVIDs 3.2 cm    LVPWd 0.76 cm    IVS/LVPW 0.92     FS 35.9 %    EDV(Teich) 115.2 ml    ESV(Teich) 40.0 ml    EF(Teich) 65.3 %    EDV(cubed) 120.9 ml    ESV(cubed) 31.9 ml    EF(cubed) 73.7 %    LV mass(C)d 118.0 grams    LV mass(C)dI 54.1 grams/m^2    SV(Teich) 75.2 ml    SI(Teich) 34.5 ml/m^2    SV(cubed) 89.1 ml    SI(cubed) 40.8 ml/m^2    Ao root diam 3.0 cm    Ao root area 7.1  cm^2    LA dimension 2.8 cm    LA/Ao 0.93     LVOT diam 2.1 cm    LVOT area 3.5 cm^2    LVOT area(traced) 3.5 cm^2    LAd major 5.1 cm    LVLd ap4 7.7 cm    EDV(MOD-sp4) 70.0 ml    LVLs ap4 6.8 cm    ESV(MOD-sp4) 31.0 ml    EF(MOD-sp4) 55.7 %    LVLd ap2 7.9 cm    EDV(MOD-sp2) 58.0 ml    LVLs ap2 7.1 cm    ESV(MOD-sp2) 24.0 ml    EF(MOD-sp2) 58.6 %    LA volume 47.0 ml    EF(MOD-bp) 56.0 %    SV(MOD-sp4) 39.0 ml    SI(MOD-sp4) 17.9 ml/m^2    SV(MOD-sp2) 34.0 ml    SI(MOD-sp2) 15.6 ml/m^2    Ao root area (BSA corrected) 1.4     LV More Vol (BSA corrected) 32.1 ml/m^2    LV Sys Vol (BSA corrected) 14.2 ml/m^2    LA Volume Index 21.5 ml/m^2    MV E max manjeet 83.9 cm/sec    MV A max manjeet 91.6 cm/sec    MV E/A 0.92     MV dec time 0.06 sec    Ao pk manjeet 140.0 cm/sec    Ao max PG 7.8 mmHg    Ao max PG (full) 2.4 mmHg    ANGELIKA(V,A) 2.9 cm^2    ANGELIKA(V,D) 2.9 cm^2    LV V1 max PG 5.5 mmHg    LV V1 mean PG 3.0 mmHg    LV V1 max 117.0 cm/sec    LV V1 mean 79.3 cm/sec    LV V1 VTI 18.0 cm    SV(LVOT) 62.3 ml    SI(LVOT) 28.6 ml/m^2    PA V2 max 107.5 cm/sec    PA max PG 4.6 mmHg    PA acc slope 528.0 cm/sec^2    PA acc time 0.12 sec    PA pr(Accel) 25.0 mmHg    Lat E/e'  4.7     Med E/e' 5.1     Lat Peak E' Manjeet 17.7 cm/sec    Med Peak E' Manjeet 16.3 cm/sec    BH CV ECHO ORIN - BZI_BMI 31.9 kilograms/m^2    BH CV ECHO ORIN - BSA(HAYCOCK) 2.3 m^2    BH CV ECHO ORIN - BZI_METRIC_WEIGHT 100.7 kg    BH CV ECHO ORIN - BZI_METRIC_HEIGHT 177.8 cm    Avg E/e' ratio 4.94     Target HR (85%) 162 bpm    Max. Pred. HR (100%) 190 bpm    BH CV VAS BP LEFT /103 mmHg    TDI S' 11.70 cm/sec    RV Base 2.90 cm    RV Length 7.70 cm    RV Mid 2.40 cm    TAPSE (>1.6) 2.10 cm2

## 2020-04-11 DIAGNOSIS — I10 ESSENTIAL HYPERTENSION: ICD-10-CM

## 2020-04-13 RX ORDER — LOSARTAN POTASSIUM 50 MG/1
50 TABLET ORAL DAILY
Qty: 30 TABLET | Refills: 0 | Status: SHIPPED | OUTPATIENT
Start: 2020-04-13 | End: 2020-05-11

## 2020-04-28 DIAGNOSIS — I10 ESSENTIAL HYPERTENSION: ICD-10-CM

## 2020-04-28 RX ORDER — LISINOPRIL 20 MG/1
TABLET ORAL
Qty: 30 TABLET | Refills: 0 | OUTPATIENT
Start: 2020-04-28

## 2020-04-28 RX ORDER — METOPROLOL SUCCINATE 50 MG/1
TABLET, EXTENDED RELEASE ORAL
Qty: 30 TABLET | Refills: 0 | Status: SHIPPED | OUTPATIENT
Start: 2020-04-28 | End: 2020-06-01

## 2020-05-10 DIAGNOSIS — I10 ESSENTIAL HYPERTENSION: ICD-10-CM

## 2020-05-11 RX ORDER — LOSARTAN POTASSIUM 50 MG/1
50 TABLET ORAL DAILY
Qty: 14 TABLET | Refills: 0 | Status: SHIPPED | OUTPATIENT
Start: 2020-05-11 | End: 2020-06-25

## 2020-05-31 DIAGNOSIS — I10 ESSENTIAL HYPERTENSION: ICD-10-CM

## 2020-06-01 RX ORDER — METOPROLOL SUCCINATE 50 MG/1
50 TABLET, EXTENDED RELEASE ORAL DAILY
Qty: 30 TABLET | Refills: 0 | Status: SHIPPED | OUTPATIENT
Start: 2020-06-01 | End: 2020-06-29

## 2020-06-25 DIAGNOSIS — I10 ESSENTIAL HYPERTENSION: ICD-10-CM

## 2020-06-25 RX ORDER — LOSARTAN POTASSIUM 50 MG/1
50 TABLET ORAL DAILY
Qty: 14 TABLET | Refills: 0 | Status: SHIPPED | OUTPATIENT
Start: 2020-06-25 | End: 2020-07-20

## 2020-06-29 DIAGNOSIS — I10 ESSENTIAL HYPERTENSION: ICD-10-CM

## 2020-06-29 RX ORDER — METOPROLOL SUCCINATE 50 MG/1
50 TABLET, EXTENDED RELEASE ORAL DAILY
Qty: 14 TABLET | Refills: 0 | Status: SHIPPED | OUTPATIENT
Start: 2020-06-29 | End: 2020-07-10

## 2020-07-10 DIAGNOSIS — I10 ESSENTIAL HYPERTENSION: ICD-10-CM

## 2020-07-10 RX ORDER — METOPROLOL SUCCINATE 50 MG/1
50 TABLET, EXTENDED RELEASE ORAL DAILY
Qty: 7 TABLET | Refills: 0 | Status: SHIPPED | OUTPATIENT
Start: 2020-07-10

## 2020-07-13 DIAGNOSIS — I10 ESSENTIAL HYPERTENSION: ICD-10-CM

## 2020-07-13 RX ORDER — METOPROLOL SUCCINATE 50 MG/1
TABLET, EXTENDED RELEASE ORAL
Qty: 7 TABLET | Refills: 0 | OUTPATIENT
Start: 2020-07-13

## 2020-07-20 DIAGNOSIS — I10 ESSENTIAL HYPERTENSION: ICD-10-CM

## 2020-07-20 RX ORDER — LOSARTAN POTASSIUM 50 MG/1
50 TABLET ORAL DAILY
Qty: 7 TABLET | Refills: 0 | Status: SHIPPED | OUTPATIENT
Start: 2020-07-20

## 2020-08-16 DIAGNOSIS — I10 ESSENTIAL HYPERTENSION: ICD-10-CM

## 2020-08-17 RX ORDER — LOSARTAN POTASSIUM 50 MG/1
TABLET ORAL
Qty: 7 TABLET | Refills: 0 | OUTPATIENT
Start: 2020-08-17

## 2024-03-06 ENCOUNTER — LAB (OUTPATIENT)
Dept: INTERNAL MEDICINE | Facility: CLINIC | Age: 34
End: 2024-03-06
Payer: COMMERCIAL

## 2024-03-06 ENCOUNTER — OFFICE VISIT (OUTPATIENT)
Dept: INTERNAL MEDICINE | Facility: CLINIC | Age: 34
End: 2024-03-06
Payer: COMMERCIAL

## 2024-03-06 VITALS
DIASTOLIC BLOOD PRESSURE: 60 MMHG | WEIGHT: 231.2 LBS | SYSTOLIC BLOOD PRESSURE: 124 MMHG | BODY MASS INDEX: 32.37 KG/M2 | OXYGEN SATURATION: 97 % | HEIGHT: 71 IN | TEMPERATURE: 97.3 F | HEART RATE: 157 BPM

## 2024-03-06 DIAGNOSIS — R73.09 ABNORMAL GLUCOSE: ICD-10-CM

## 2024-03-06 DIAGNOSIS — I10 HYPERTENSION, UNSPECIFIED TYPE: Primary | ICD-10-CM

## 2024-03-06 DIAGNOSIS — Z11.59 ENCOUNTER FOR HEPATITIS C SCREENING TEST FOR LOW RISK PATIENT: ICD-10-CM

## 2024-03-06 DIAGNOSIS — R00.0 TACHYCARDIA: ICD-10-CM

## 2024-03-06 DIAGNOSIS — I10 HYPERTENSION, UNSPECIFIED TYPE: ICD-10-CM

## 2024-03-06 DIAGNOSIS — Z30.2 ENCOUNTER FOR STERILIZATION: ICD-10-CM

## 2024-03-06 DIAGNOSIS — E66.01 MORBID (SEVERE) OBESITY DUE TO EXCESS CALORIES: ICD-10-CM

## 2024-03-06 DIAGNOSIS — R79.89 HIGH SERUM LOW DENSITY LIPOPROTEIN (LDL) CHOLESTEROL: ICD-10-CM

## 2024-03-06 DIAGNOSIS — J45.40 MODERATE PERSISTENT ASTHMA WITHOUT COMPLICATION: ICD-10-CM

## 2024-03-06 LAB
ALBUMIN SERPL-MCNC: 4.5 G/DL (ref 3.5–5.2)
ALBUMIN/GLOB SERPL: 1.7 G/DL
ALP SERPL-CCNC: 97 U/L (ref 39–117)
ALT SERPL W P-5'-P-CCNC: 35 U/L (ref 1–41)
ANION GAP SERPL CALCULATED.3IONS-SCNC: 14.6 MMOL/L (ref 5–15)
AST SERPL-CCNC: 26 U/L (ref 1–40)
BASOPHILS # BLD AUTO: 0.06 10*3/MM3 (ref 0–0.2)
BASOPHILS NFR BLD AUTO: 0.7 % (ref 0–1.5)
BILIRUB SERPL-MCNC: 0.4 MG/DL (ref 0–1.2)
BUN SERPL-MCNC: 13 MG/DL (ref 6–20)
BUN/CREAT SERPL: 12.6 (ref 7–25)
CALCIUM SPEC-SCNC: 9.4 MG/DL (ref 8.6–10.5)
CHLORIDE SERPL-SCNC: 101 MMOL/L (ref 98–107)
CHOLEST SERPL-MCNC: 201 MG/DL (ref 0–200)
CO2 SERPL-SCNC: 18.4 MMOL/L (ref 22–29)
CREAT SERPL-MCNC: 1.03 MG/DL (ref 0.76–1.27)
DEPRECATED RDW RBC AUTO: 37.2 FL (ref 37–54)
EGFRCR SERPLBLD CKD-EPI 2021: 97.8 ML/MIN/1.73
EOSINOPHIL # BLD AUTO: 0.08 10*3/MM3 (ref 0–0.4)
EOSINOPHIL NFR BLD AUTO: 0.9 % (ref 0.3–6.2)
ERYTHROCYTE [DISTWIDTH] IN BLOOD BY AUTOMATED COUNT: 12.1 % (ref 12.3–15.4)
GLOBULIN UR ELPH-MCNC: 2.6 GM/DL
GLUCOSE SERPL-MCNC: 142 MG/DL (ref 65–99)
HBA1C MFR BLD: 5.1 % (ref 4.8–5.6)
HCT VFR BLD AUTO: 48.6 % (ref 37.5–51)
HDLC SERPL-MCNC: 39 MG/DL (ref 40–60)
HGB BLD-MCNC: 16.4 G/DL (ref 13–17.7)
IMM GRANULOCYTES # BLD AUTO: 0.03 10*3/MM3 (ref 0–0.05)
IMM GRANULOCYTES NFR BLD AUTO: 0.3 % (ref 0–0.5)
LDLC SERPL CALC-MCNC: 136 MG/DL (ref 0–100)
LDLC/HDLC SERPL: 3.41 {RATIO}
LYMPHOCYTES # BLD AUTO: 1.73 10*3/MM3 (ref 0.7–3.1)
LYMPHOCYTES NFR BLD AUTO: 19.2 % (ref 19.6–45.3)
MCH RBC QN AUTO: 28.9 PG (ref 26.6–33)
MCHC RBC AUTO-ENTMCNC: 33.7 G/DL (ref 31.5–35.7)
MCV RBC AUTO: 85.7 FL (ref 79–97)
MONOCYTES # BLD AUTO: 0.82 10*3/MM3 (ref 0.1–0.9)
MONOCYTES NFR BLD AUTO: 9.1 % (ref 5–12)
NEUTROPHILS NFR BLD AUTO: 6.29 10*3/MM3 (ref 1.7–7)
NEUTROPHILS NFR BLD AUTO: 69.8 % (ref 42.7–76)
NRBC BLD AUTO-RTO: 0 /100 WBC (ref 0–0.2)
PLATELET # BLD AUTO: 336 10*3/MM3 (ref 140–450)
PMV BLD AUTO: 9.6 FL (ref 6–12)
POTASSIUM SERPL-SCNC: 3.9 MMOL/L (ref 3.5–5.2)
PROT SERPL-MCNC: 7.1 G/DL (ref 6–8.5)
RBC # BLD AUTO: 5.67 10*6/MM3 (ref 4.14–5.8)
SODIUM SERPL-SCNC: 134 MMOL/L (ref 136–145)
TRIGL SERPL-MCNC: 145 MG/DL (ref 0–150)
VLDLC SERPL-MCNC: 26 MG/DL (ref 5–40)
WBC NRBC COR # BLD AUTO: 9.01 10*3/MM3 (ref 3.4–10.8)

## 2024-03-06 PROCEDURE — 85025 COMPLETE CBC W/AUTO DIFF WBC: CPT | Performed by: STUDENT IN AN ORGANIZED HEALTH CARE EDUCATION/TRAINING PROGRAM

## 2024-03-06 PROCEDURE — 80061 LIPID PANEL: CPT | Performed by: STUDENT IN AN ORGANIZED HEALTH CARE EDUCATION/TRAINING PROGRAM

## 2024-03-06 PROCEDURE — 36415 COLL VENOUS BLD VENIPUNCTURE: CPT | Performed by: STUDENT IN AN ORGANIZED HEALTH CARE EDUCATION/TRAINING PROGRAM

## 2024-03-06 PROCEDURE — 83036 HEMOGLOBIN GLYCOSYLATED A1C: CPT | Performed by: STUDENT IN AN ORGANIZED HEALTH CARE EDUCATION/TRAINING PROGRAM

## 2024-03-06 PROCEDURE — 86803 HEPATITIS C AB TEST: CPT | Performed by: STUDENT IN AN ORGANIZED HEALTH CARE EDUCATION/TRAINING PROGRAM

## 2024-03-06 PROCEDURE — 80053 COMPREHEN METABOLIC PANEL: CPT | Performed by: STUDENT IN AN ORGANIZED HEALTH CARE EDUCATION/TRAINING PROGRAM

## 2024-03-06 RX ORDER — NIFEDIPINE 30 MG
30 TABLET, EXTENDED RELEASE ORAL DAILY
COMMUNITY
Start: 2023-11-08 | End: 2024-05-06

## 2024-03-06 RX ORDER — LOSARTAN POTASSIUM 100 MG/1
100 TABLET ORAL DAILY
COMMUNITY
Start: 2023-08-23 | End: 2024-08-22

## 2024-03-06 NOTE — PROGRESS NOTES
"    Office Note     Name: Hayder Farley    : 1990     MRN: [id number]     Chief Complaint  Sterilization (Would like information about vasectomy )    Subjective     History of Present Illness:  Hayder Farley is a 33 y.o. male who presents today for     Patient is interested in Vasectomy, went to  urologist and wants to see [facility]ast[facility].  Desires male sterilization - patient has 2 kids, his wife and himself do not want anymore, he is requesting a referral for vasectomy. He has reviewed all the options with his wife and they feel this is best, no questions or concerns at this time.       HTN  Has been on medicine for a chronic period of time  Currently taking losartan, nifedipine.   He has had high BP since his teens  Seen a nephrologist in the past.  He exercises and eats a low salt diet    asthma  He has mild asthma controlled on montelukast   In the past he was using inhaler.      Tachycardia  Was seen in  by cardiologist, had Echo and stress done which were unremarkable  Has not had holter monitoring  In the past was on a beta blocker but was stopped    Review of Systems:   Review of Systems   All other systems reviewed and are negative.      Past Medical History:   Past Medical History:   Diagnosis Date    Allergic     As a toddler    Asthma     Essential hypertension 2019    Hyperlipidemia LDL goal <100 2020    Hypertension     STATES \"ITS WHITE COAT HYPERTENSION\"       Past Surgical History:   Past Surgical History:   Procedure Laterality Date    MOUTH SURGERY         Family History:   Family History   Problem Relation Age of Onset    Cancer Mother         Breast Cancer    Breast cancer Mother     Heart attack Maternal Grandfather     Heart disease Maternal Grandfather     Heart attack Paternal Grandfather     Heart disease Paternal Grandfather     Asthma Father     Hyperlipidemia Father     Heart disease Paternal Uncle     Heart disease Maternal Grandmother     Heart disease " "Paternal Grandmother        Social History:   Social History     Socioeconomic History    Marital status:    Tobacco Use    Smoking status: Never    Smokeless tobacco: Never   Vaping Use    Vaping status: Never Used   Substance and Sexual Activity    Alcohol use: No    Drug use: No    Sexual activity: Yes     Partners: Female     Birth control/protection: None       Immunizations:   Immunization History   Administered Date(s) Administered    COVID-19 (PFIZER) Purple Cap Monovalent 03/06/2021, 03/31/2021    Influenza Seasonal Injectable 12/01/2020    Tdap 07/31/2018    flucelvax quad pfs =>4 YRS 11/01/2019        Medications:     Current Outpatient Medications:     cetirizine (ZYRTEC ALLERGY) 10 MG tablet, Zyrtec 10 mg tablet  Daily, Disp: , Rfl:     losartan (COZAAR) 100 MG tablet, Take 1 tablet by mouth Daily., Disp: , Rfl:     montelukast (SINGULAIR) 10 MG tablet, Take 1 tablet by mouth Every Night., Disp: 30 tablet, Rfl: 5    NIFEdipine CC (ADALAT CC) 30 MG 24 hr tablet, Take 1 tablet by mouth Daily., Disp: , Rfl:     albuterol sulfate  (90 Base) MCG/ACT inhaler, Inhale 2 puffs Every 4 (Four) Hours As Needed for Wheezing. (Patient not taking: Reported on 3/6/2024), Disp: 18 g, Rfl: 3    Allergies:   Allergies   Allergen Reactions    Lisinopril Cough    Penicillins Other (See Comments)     As baby, pt does not know reaction       Objective     Vital Signs  /60   Pulse (!) 157   Temp 97.3 °F (36.3 °C) (Temporal)   Ht 179.7 cm (70.75\")   Wt 105 kg (231 lb 3.2 oz)   SpO2 97%   BMI 32.47 kg/m²   Estimated body mass index is 32.47 kg/m² as calculated from the following:    Height as of this encounter: 179.7 cm (70.75\").    Weight as of this encounter: 105 kg (231 lb 3.2 oz).          Physical Exam  Constitutional:       Appearance: Normal appearance. He is obese.   Cardiovascular:      Rate and Rhythm: Regular rhythm. Tachycardia present.      Heart sounds: Normal heart sounds.   Neurological: "      General: No focal deficit present.      Mental Status: He is alert and oriented to person, place, and time.          Result Review :            ECG 12 Lead    Date/Time: 3/6/2024 10:28 AM  Performed by: Leonel Sifuentes MD    Authorized by: Leonel Sifuentes MD  Comparison: not compared with previous ECG   Rhythm: sinus tachycardia  Rate: tachycardic  Conduction: conduction normal  ST Segments: ST segments normal  T Waves: T waves normal  QRS axis: normal    Clinical impression: abnormal EKG             Assessment and Plan     1. Hypertension, unspecified type  Controlled  On Losartan and nifedipine  - CBC Auto Differential; Future  - Comprehensive Metabolic Panel    2. Abnormal glucose    - Hemoglobin A1c; Future    3. High serum low density lipoprotein (LDL) cholesterol  Rehceck lipanel    4. Encounter for hepatitis C screening test for low risk patient    - Lipid Panel; Future  - HCV Antibody Rfx To Qnt PCR; Future    5. Encounter for sterilization    - Ambulatory Referral to Urology    6. Tachycardia  Asymptomatic, reviewed prior Echo and stress test    - ECG 12 Lead  - Ambulatory Referral to Cardiology    7. Moderate persistent asthma without complication  Continue with Singulair    8. Morbid (severe) obesity due to excess calories         Follow Up  Return in about 6 months (around 9/6/2024) for Annual.    Leonel Sifuentes MD  [facility]  [facility]  [address]  [address] [address] [phone]

## 2024-03-07 LAB
HCV AB SERPL QL IA: NORMAL
HCV IGG SERPL QL IA: NON REACTIVE

## 2024-04-26 ENCOUNTER — TELEPHONE (OUTPATIENT)
Dept: CARDIOLOGY | Facility: CLINIC | Age: 34
End: 2024-04-26
Payer: COMMERCIAL

## 2024-04-26 NOTE — TELEPHONE ENCOUNTER
Caller: Hayder Farley    Relationship to patient: Self    Best call back number: [phone]    Chief complaint: PATIENT WAS UNABLE TO MAKE HIS APPOINTMENT ON 03.22.24. HE WOULD LIKE TO RESCHEDULE. HUB REQUIRES FURTHER REVIEW TO COMPLETE TASK. PLEASE REACH OUT TO THE PATIENT TO SCHEDULE.     Type of visit: NEW PATIENT    Requested date: ASAP    If rescheduling, when is the original appointment: 03.22.24

## 2024-05-01 ENCOUNTER — TELEPHONE (OUTPATIENT)
Dept: UROLOGY | Facility: CLINIC | Age: 34
End: 2024-05-01
Payer: COMMERCIAL

## 2024-05-01 NOTE — TELEPHONE ENCOUNTER
"  Caller: COURT TRIVEDI    Relationship: SELF    Best call back number: [phone]    What is the best time to reach you: ANYTIME    Who are you requesting to speak with (clinical staff, provider,  specific staff member): DR. DE JESUS    Do you know the name of the person who called: N/A    What was the call regarding: PT CHECKING STATUS ON REFERRAL FOR TRANSFER OF CARE PER REFERRAL LAST COMMUNICATION \"SPK TO PT - ADVISED PER REVIEW PT SHOULD STAY WITH [facility]. PT ADVISED HE IS WANTING TO TRANSFER CARE TO [facility] DUE TO INSURANCE. PROCEDURE IS SIGNIFICANTLY LESS OUT OF POCKET EXPENSE WITH [facility]. PLEASE ADVISE IF OK TO SCHEDULE. \"    Is it okay if the provider responds through [employer]: N/A  "

## 2024-05-21 ENCOUNTER — OFFICE VISIT (OUTPATIENT)
Age: 34
End: 2024-05-21
Payer: COMMERCIAL

## 2024-05-21 VITALS — WEIGHT: 231 LBS | HEIGHT: 71 IN | BODY MASS INDEX: 32.34 KG/M2

## 2024-05-21 DIAGNOSIS — Z30.09 UNWANTED FERTILITY: Primary | ICD-10-CM

## 2024-05-21 NOTE — PROGRESS NOTES
"     Office Note Vasectomy Consult     Patient Name: Hayder Farley  : 1990   MRN: [id number]     Chief Complaint:  Elective Sterilization.   Chief Complaint   Patient presents with    unwanted fertility       Referring Provider: Leonel Sifuentes MD    History of Present Illness: Hayder Farley is a 33 y.o. male who presents to Urology today with the desire for irreversible, elective sterilization.  He has 2 children and is .  They have discussed this and would like to move forward.    No scrotal pain or discomfort.  No scrotal surgery or hernia repairs.  No LUTS.  No dysuria or gross hematuria.       Subjective      Review of Systems: Review of Systems   Constitutional: Negative.    HENT: Negative.     Eyes: Negative.    Respiratory: Negative.     Cardiovascular: Negative.    Gastrointestinal: Negative.    Endocrine: Negative.    Genitourinary: Negative.    Musculoskeletal: Negative.    Skin: Negative.    Allergic/Immunologic: Negative.    Neurological: Negative.    Hematological: Negative.    Psychiatric/Behavioral: Negative.        I have reviewed the ROS documented by my clinical staff, I have updated appropriately and I agree. Shay Camp MD    Past Medical History:   Past Medical History:   Diagnosis Date    Allergic     As a toddler    Asthma     Essential hypertension 2019    Hyperlipidemia LDL goal <100 2020    Hypertension     STATES \"ITS WHITE COAT HYPERTENSION\"       Past Surgical History:   Past Surgical History:   Procedure Laterality Date    MOUTH SURGERY         Family History:   Family History   Problem Relation Age of Onset    Cancer Mother         Breast Cancer    Breast cancer Mother     Heart attack Maternal Grandfather     Heart disease Maternal Grandfather     Heart attack Paternal Grandfather     Heart disease Paternal Grandfather     Asthma Father     Hyperlipidemia Father     Heart disease Paternal Uncle     Heart disease Maternal Grandmother     " Heart disease Paternal Grandmother        Social History:   Social History     Socioeconomic History    Marital status:    Tobacco Use    Smoking status: Never     Passive exposure: Past    Smokeless tobacco: Never   Vaping Use    Vaping status: Never Used   Substance and Sexual Activity    Alcohol use: No    Drug use: No    Sexual activity: Yes     Partners: Female     Birth control/protection: None       Medications:     Current Outpatient Medications:     cetirizine (ZYRTEC ALLERGY) 10 MG tablet, Zyrtec 10 mg tablet  Daily, Disp: , Rfl:     losartan (COZAAR) 100 MG tablet, Take 1 tablet by mouth Daily., Disp: , Rfl:     montelukast (SINGULAIR) 10 MG tablet, Take 1 tablet by mouth Every Night., Disp: 30 tablet, Rfl: 5    albuterol sulfate  (90 Base) MCG/ACT inhaler, Inhale 2 puffs Every 4 (Four) Hours As Needed for Wheezing. (Patient not taking: Reported on 5/21/2024), Disp: 18 g, Rfl: 3    NIFEdipine CC (ADALAT CC) 30 MG 24 hr tablet, Take 1 tablet by mouth Daily., Disp: , Rfl:     Allergies:   Allergies   Allergen Reactions    Lisinopril Cough    Penicillins Other (See Comments)     As baby, pt does not know reaction       IPSS Questionnaire (AUA-7):  Over the past month…    1)  Incomplete Emptying  How often have you had a sensation of not emptying your bladder?  0 - Not at all   2)  Frequency  How often have you had to urinate less than every two hours? 3 - About half the time   3)  Intermittency  How often have you found you stopped and started again several times when you urinated?  0 - Not at all   4) Urgency  How often have you found it difficult to postpone urination?  2 - Less than half the time   5) Weak Stream  How often have you had a weak urinary stream?  0 - Not at all   6) Straining  How often have you had to push or strain to begin urination?  0 - Not at all   7) Nocturia  How many times did you typically get up at night to urinate?  0 - None   Total Score:  5       Quality of life  "due to urinary symptoms:  If you were to spend the rest of your life with your urinary condition the way it is now, how would you feel about that? 2-Mostly SatisfiedP   Urine Leakage (Incontinence) 0-No Leakage         Objective     Physical Exam:   Vital Signs:   Vitals:    05/21/24 1344   Weight: 105 kg (231 lb)   Height: 179.7 cm (70.75\")   PainSc: 0-No pain     Body mass index is 32.45 kg/m².     Physical Exam  Vitals and nursing note reviewed.   Constitutional:       General: He is awake. He is not in acute distress.     Appearance: Normal appearance. He is well-developed. He is obese.   HENT:      Head: Normocephalic and atraumatic.      Right Ear: External ear normal.      Left Ear: External ear normal.      Nose: Nose normal.   Eyes:      Conjunctiva/sclera: Conjunctivae normal.   Pulmonary:      Effort: Pulmonary effort is normal.   Abdominal:      General: There is no distension.      Palpations: Abdomen is soft. There is no mass.      Tenderness: There is no abdominal tenderness. There is no right CVA tenderness, left CVA tenderness, guarding or rebound.      Hernia: No hernia is present. There is no hernia in the left inguinal area or right inguinal area.   Genitourinary:     Pubic Area: No rash.       Penis: Normal.       Testes: Normal.      Rectum: No mass or tenderness. Normal anal tone.   Musculoskeletal:      Cervical back: Normal range of motion.   Lymphadenopathy:      Lower Body: No right inguinal adenopathy. No left inguinal adenopathy.   Skin:     General: Skin is warm.   Neurological:      General: No focal deficit present.      Mental Status: He is alert and oriented to person, place, and time.   Psychiatric:         Behavior: Behavior normal. Behavior is cooperative.         Labs:   Brief Urine Lab Results       None                 Lab Results   Component Value Date    GLUCOSE 142 (H) 03/06/2024    CALCIUM 9.4 03/06/2024     (L) 03/06/2024    K 3.9 03/06/2024    CO2 18.4 (L) " 03/06/2024     03/06/2024    BUN 13 03/06/2024    CREATININE 1.03 03/06/2024    EGFRIFAFRI 122 01/10/2020    EGFRIFNONA 100 01/10/2020    BCR 12.6 03/06/2024    ANIONGAP 14.6 03/06/2024       Lab Results   Component Value Date    WBC 9.01 03/06/2024    HGB 16.4 03/06/2024    HCT 48.6 03/06/2024    MCV 85.7 03/06/2024     03/06/2024       Images:   No Images in the past 120 days found..    Measures:   Tobacco:   Hayder Farley  reports that he has never smoked. He has been exposed to tobacco smoke. He has never used smokeless tobacco.       Urine Incontinence: Patient reports that he is not currently experiencing any symptoms of urinary incontinence.         Assessment / Plan      Assessment/Plan:   Mr. Farley is a 33 y.o. male who presented to clinic today for irreversible elective sterilization.  We will plan on doing this under some sedation.  We will have him scheduled at his convenience.     Diagnoses and all orders for this visit:    1. Unwanted fertility (Primary)  -     External Facility Surgical/Procedural Request; Future         Patient Education:   The patient has requested a vasectomy for elective sterilization and the risks and benefits of the procedure were explained at length. The procedure itself was explained and postop followup explained at this point.  We discussed he will need a  to take him home. I extensively reviewed with him the likely postoperative recuperative period as well as the need to continue to use contraception until he is notified by me of his sterility.     He will undergo a semen analysis 3 months post-procedure AND 20 ejaculations before his first semen analysis check. He understands the potential side effects of anesthesia, bleeding, scrotal hematoma, wound infection, epididymo-orchitis, epididymal congestion, chronic testicular pain requiring further intervention/surgery, sperm granuloma, recanalization and risk of sperm return and/or pregnancy  (1 in  2000 as per the AUA guidelines). The patient wishes to proceed with vasectomy.     Follow Up:   Return for Follow up after procedure.    I spent approximately 30 minutes providing clinical care for this patient; including review of patient's chart and provider documentation, face to face time spent with patient in examination room (obtaining history, performing physical exam, discussing diagnosis and management options), placing orders, and completing patient documentation.     Shay Camp MD  [facility]

## 2024-05-23 NOTE — PROGRESS NOTES
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 Shahid 2020    PROBLEM LIST:   Tachycardia/PVCs  1/20 GXT: normal  1/20 Echo: normal  5/24 sinus tach  HTN  Reported whitecoat hypertension, negative workup at age 15  On losartan since ~2019  Hyperhidrosis  HLD  3/24  [phone]  Surgical history:  Oral surgery      CC:  Chief Complaint   Patient presents with    Hypertension    Rapid Heart Rate       Allergies  Allergies   Allergen Reactions    Lisinopril Cough    Penicillins Other (See Comments)     As baby, pt does not know reaction       Current Medications  Current Outpatient Medications   Medication Instructions    cetirizine (ZYRTEC ALLERGY) 10 MG tablet Zyrtec 10 mg tablet   Daily    losartan (COZAAR) 100 mg, Oral, Daily    montelukast (SINGULAIR) 10 mg, Oral, Nightly    nebivolol (BYSTOLIC) 5 mg, Oral, Daily        History of Present Illness   HPI  Hayder Farley is a 33 y.o. year old male with the above mentioned PMH who presents for consult from Leonel Sifuentes MD for evaluation of tachycardia and hypertension.  He states he has had prior workup at age 15 for high blood pressure and high heart rate.  Testing at that time reportedly benign.  He had echo and GXT in 2020 that were also within normal limits.  He has been on medication for blood pressure for the last 5 or 6 years, nifedipine added in November.  He reports home blood pressures are typically 130-140/90.  He is asymptomatic with tachycardia, denies dizziness and presyncope/syncope.  He wears an Apple Watch and states resting heart rate 70s overnight.  He reports 1-2 times a week he will get an alert that his heart rate is greater than 120 at rest.  He is  "very active with his job and denies any symptoms which limit his work.  He drinks 2 to 3 cups of coffee a day and tries to drink equal amounts of water.  He does report snoring and some daytime somnolence.  He also notes he sweats excessively.  Has never had any workup for this.      Pt denies any chest pain, dyspnea at rest, dyspnea on exertion, orthopnea, PND, palpitations, lower extremity edema, or claudication. Pt denies history of CHF, DVT, PE, MI, CVA, TIA, or rheumatic fever.       ROS  Review of Systems   Constitutional: Positive for diaphoresis.   All other systems reviewed and are negative.      SOCIAL HX  Social History     Socioeconomic History    Marital status:    Tobacco Use    Smoking status: Never     Passive exposure: Past    Smokeless tobacco: Never   Vaping Use    Vaping status: Never Used   Substance and Sexual Activity    Alcohol use: No    Drug use: No    Sexual activity: Yes     Partners: Female     Birth control/protection: None       FAMILY HX  Family History   Problem Relation Age of Onset    Cancer Mother         Breast Cancer    Breast cancer Mother     Heart attack Maternal Grandfather     Heart disease Maternal Grandfather     Heart attack Paternal Grandfather     Heart disease Paternal Grandfather     Asthma Father     Hyperlipidemia Father     Heart disease Paternal Uncle     Heart disease Maternal Grandmother     Heart disease Paternal Grandmother        Vitals:    05/24/24 0842   BP: 152/94   BP Location: Left arm   Patient Position: Sitting   Cuff Size: Adult   Pulse: (!) 139   SpO2: 98%   Weight: 105 kg (232 lb 3.2 oz)   Height: 180.3 cm (71\")     Body mass index is 32.39 kg/m².     PHYSICAL EXAMINATION:  Vitals reviewed.   Constitutional:       Appearance: Normal and healthy appearance. Well-developed and not in distress.   Neck:      Thyroid: Thyroid normal.   Pulmonary:      Effort: Pulmonary effort is normal.      Breath sounds: Normal breath sounds.   Cardiovascular: "      PMI at left midclavicular line. Tachycardia present. Regular rhythm. Normal S1. Normal S2.       Murmurs: There is no murmur.      No gallop.  No rub.   Pulses:     Intact distal pulses.   Edema:     Peripheral edema absent.   Skin:     General: Skin is warm and dry.   Neurological:      General: No focal deficit present.      Mental Status: Alert and oriented to person, place and time.   Psychiatric:         Behavior: Behavior is cooperative.         Diagnostic Data:    ECG 12 Lead    Date/Time: 5/24/2024 9:45 AM  Performed by: Jeison Lua MD    Authorized by: Jeison Lau MD  Comparison: compared with previous ECG from 3/6/2024  Comparison to previous ECG: No PVCs today  Rhythm: sinus tachycardia  Rate: tachycardic  BPM: 139  Comments: Normal except for rate        Lab Results   Component Value Date    CHOL 201 (H) 03/06/2024    CHLPL 204 (H) 01/10/2020    TRIG 145 03/06/2024    HDL 39 (L) 03/06/2024     (H) 03/06/2024      Lab Results   Component Value Date    GLUCOSE 142 (H) 03/06/2024    CALCIUM 9.4 03/06/2024     (L) 03/06/2024    K 3.9 03/06/2024    CO2 18.4 (L) 03/06/2024     03/06/2024    BUN 13 03/06/2024    CREATININE 1.03 03/06/2024    EGFR 97.8 03/06/2024    BCR 12.6 03/06/2024    ANIONGAP 14.6 03/06/2024      Lab Results   Component Value Date    WBC 9.01 03/06/2024    HGB 16.4 03/06/2024    HCT 48.6 03/06/2024    MCV 85.7 03/06/2024     03/06/2024     Lab Results   Component Value Date    HGBA1C 5.10 03/06/2024      Lab Results   Component Value Date    TSH 1.160 11/01/2019          ASSESSMENT:     Diagnosis Plan   1. Essential hypertension  TSH Rfx On Abnormal To Free T4    Catecholamines, Fractionated, Plasma    nebivolol (BYSTOLIC) 5 MG tablet    Metanephrines, Frac. Free, Plasma    Aldosterone / Renin Ratio      2. Hyperhidrosis        3. Sinus tachycardia            PLAN:      Sinus tachycardia, asymptomatic. Echocardiogram 2020 within normal limits.    -Check thyroid  -Continue to monitor heart rate on Apple Watch.  -Change nifedipine to nebivolol    Hypertension, not well-controlled  -Discontinue nifedipine  -Begin nebivolol 5 mg daily  -Continue losartan 100 mg daily    Hyperhidrosis, hypertension, tachycardia - we will screen for secondary causes  -Check plasma metanephrines, catecholamines, renin/aldosterone ratio    Results will be called to him and we will schedule follow-up in 3 months.    Leonel Sifuentes MD, thank you for referring Mr. Farley for evaluation.  I have forwarded my electronically generated recommendations to you for review.  Please do not hesitate to call with any questions.    Scribed for Jeison Lau MD by Zayra Wiggins, APRN. 5/24/2024  09:25 EDT   Jeison Lau MD, FACC

## 2024-05-24 ENCOUNTER — OFFICE VISIT (OUTPATIENT)
Dept: CARDIOLOGY | Facility: CLINIC | Age: 34
End: 2024-05-24
Payer: COMMERCIAL

## 2024-05-24 ENCOUNTER — LAB (OUTPATIENT)
Dept: LAB | Facility: HOSPITAL | Age: 34
End: 2024-05-24
Payer: COMMERCIAL

## 2024-05-24 VITALS
DIASTOLIC BLOOD PRESSURE: 94 MMHG | OXYGEN SATURATION: 98 % | BODY MASS INDEX: 32.51 KG/M2 | SYSTOLIC BLOOD PRESSURE: 152 MMHG | HEIGHT: 71 IN | WEIGHT: 232.2 LBS | HEART RATE: 139 BPM

## 2024-05-24 DIAGNOSIS — I10 ESSENTIAL HYPERTENSION: ICD-10-CM

## 2024-05-24 DIAGNOSIS — R61 HYPERHIDROSIS: ICD-10-CM

## 2024-05-24 DIAGNOSIS — I10 ESSENTIAL HYPERTENSION: Primary | ICD-10-CM

## 2024-05-24 DIAGNOSIS — R00.0 SINUS TACHYCARDIA: ICD-10-CM

## 2024-05-24 LAB — TSH SERPL DL<=0.05 MIU/L-ACNC: 0.91 UIU/ML (ref 0.27–4.2)

## 2024-05-24 PROCEDURE — 82384 ASSAY THREE CATECHOLAMINES: CPT

## 2024-05-24 PROCEDURE — 84443 ASSAY THYROID STIM HORMONE: CPT

## 2024-05-24 PROCEDURE — 36415 COLL VENOUS BLD VENIPUNCTURE: CPT

## 2024-05-24 PROCEDURE — 84244 ASSAY OF RENIN: CPT

## 2024-05-24 PROCEDURE — 82088 ASSAY OF ALDOSTERONE: CPT

## 2024-05-24 PROCEDURE — 83835 ASSAY OF METANEPHRINES: CPT

## 2024-05-24 RX ORDER — NEBIVOLOL 5 MG/1
5 TABLET ORAL DAILY
Qty: 30 TABLET | Refills: 11 | Status: SHIPPED | OUTPATIENT
Start: 2024-05-24

## 2024-05-30 LAB
ALDOST SERPL-MCNC: 6.7 NG/DL (ref 0–30)
ALDOST/RENIN PLAS-RTO: 0.8 {RATIO} (ref 0–30)
RENIN PLAS-CCNC: 8.23 NG/ML/HR (ref 0.17–5.38)

## 2024-05-31 LAB
DOPAMINE SERPL-MCNC: <30 PG/ML (ref 0–48)
EPINEPH PLAS-MCNC: 121 PG/ML (ref 0–62)
NOREPINEPH PLAS-MCNC: 1083 PG/ML (ref 0–874)

## 2024-06-07 LAB
METANEPH FREE SERPL-MCNC: <25 PG/ML (ref 0–88)
NORMETANEPHRINE SERPL-MCNC: 32.7 PG/ML (ref 0–210.1)

## 2024-06-19 ENCOUNTER — TRANSCRIBE ORDERS (OUTPATIENT)
Dept: CARDIOLOGY | Facility: CLINIC | Age: 34
End: 2024-06-19
Payer: COMMERCIAL

## 2024-06-19 DIAGNOSIS — I10 ESSENTIAL HYPERTENSION WITH HIGH RENIN LEVEL: ICD-10-CM

## 2024-06-19 DIAGNOSIS — I1A.0 RESISTANT HYPERTENSION: Primary | ICD-10-CM

## 2024-06-19 DIAGNOSIS — R82.5 HIGH CATECHOLAMINES: ICD-10-CM

## 2024-07-05 ENCOUNTER — HOSPITAL ENCOUNTER (OUTPATIENT)
Dept: CT IMAGING | Facility: HOSPITAL | Age: 34
Discharge: HOME | End: 2024-07-05
Payer: COMMERCIAL

## 2024-07-05 DIAGNOSIS — I1A.0 RESISTANT HYPERTENSION: ICD-10-CM

## 2024-07-05 DIAGNOSIS — R82.5 HIGH CATECHOLAMINES: ICD-10-CM

## 2024-07-05 DIAGNOSIS — I10 ESSENTIAL HYPERTENSION WITH HIGH RENIN LEVEL: ICD-10-CM

## 2024-07-05 PROCEDURE — 25510000001 IOPAMIDOL PER 1 ML

## 2024-07-05 PROCEDURE — 74170 CT ABD WO CNTRST FLWD CNTRST: CPT

## 2024-07-05 RX ADMIN — IOPAMIDOL 85 ML: 755 INJECTION, SOLUTION INTRAVENOUS at 09:39

## 2024-07-12 ENCOUNTER — OUTSIDE FACILITY SERVICE (OUTPATIENT)
Dept: UROLOGY | Facility: CLINIC | Age: 34
End: 2024-07-12
Payer: COMMERCIAL

## 2025-01-27 DIAGNOSIS — J45.40 MODERATE PERSISTENT ASTHMA WITHOUT COMPLICATION: ICD-10-CM

## 2025-01-28 RX ORDER — MONTELUKAST SODIUM 10 MG/1
10 TABLET ORAL NIGHTLY
Qty: 30 TABLET | Refills: 1 | Status: SHIPPED | OUTPATIENT
Start: 2025-01-28

## 2025-02-06 ENCOUNTER — OFFICE VISIT (OUTPATIENT)
Dept: INTERNAL MEDICINE | Facility: CLINIC | Age: 35
End: 2025-02-06
Payer: COMMERCIAL

## 2025-02-06 VITALS
SYSTOLIC BLOOD PRESSURE: 134 MMHG | HEIGHT: 71 IN | OXYGEN SATURATION: 96 % | BODY MASS INDEX: 33.29 KG/M2 | TEMPERATURE: 97.8 F | WEIGHT: 237.8 LBS | HEART RATE: 116 BPM | DIASTOLIC BLOOD PRESSURE: 78 MMHG

## 2025-02-06 DIAGNOSIS — R79.89 HIGH SERUM LOW DENSITY LIPOPROTEIN (LDL) CHOLESTEROL: ICD-10-CM

## 2025-02-06 DIAGNOSIS — J30.9 CHRONIC ALLERGIC RHINITIS: ICD-10-CM

## 2025-02-06 DIAGNOSIS — J32.9 CHRONIC SINUSITIS, UNSPECIFIED LOCATION: ICD-10-CM

## 2025-02-06 DIAGNOSIS — E66.01 MORBID (SEVERE) OBESITY DUE TO EXCESS CALORIES: ICD-10-CM

## 2025-02-06 DIAGNOSIS — Z00.00 WELL ADULT EXAM: Primary | ICD-10-CM

## 2025-02-06 DIAGNOSIS — I10 HYPERTENSION, UNSPECIFIED TYPE: ICD-10-CM

## 2025-02-06 RX ORDER — NEBIVOLOL 5 MG/1
5 TABLET ORAL DAILY
COMMUNITY

## 2025-02-06 RX ORDER — LOSARTAN POTASSIUM 100 MG/1
100 TABLET ORAL DAILY
Qty: 90 TABLET | Refills: 3 | Status: SHIPPED | OUTPATIENT
Start: 2025-02-06

## 2025-02-06 NOTE — PROGRESS NOTES
"    Office Note     Name: Hayder Farley    : 1990     MRN: [id number]     Chief Complaint  Annual Exam    Subjective     History of Present Illness:  Hayder Farley is a 34 y.o. male who presents today for     Here for annual exam    HTN  Has been on medicine for a chronic period of time  Currently: Losartan 100mg daily and Bystolic 5mg daily    chronic sinus problems.   Symptoms include allergies, facial pressure, post nasal drip, rhinorrhea, and throat clearing. Patient has had approximately few acute sinus infections in the past year. Patient admits to a history of allergic rhinitis. Patient admits to chronic use of OTC nasal decongestant sprays. Previous treatment has included nasal steroids, oral decongestants, and over the counter antihistamines, which have given moderate relief.      Review of Systems:   Review of Systems   All other systems reviewed and are negative.      Past Medical History:   Past Medical History:   Diagnosis Date    Allergic     As a toddler    Asthma     Essential hypertension 2019    Hyperlipidemia LDL goal <100 2020    Hypertension     STATES \"ITS WHITE COAT HYPERTENSION\"       Past Surgical History:   Past Surgical History:   Procedure Laterality Date    MOUTH SURGERY         Family History:   Family History   Problem Relation Age of Onset    Cancer Mother         Breast Cancer    Breast cancer Mother     Heart attack Maternal Grandfather     Heart disease Maternal Grandfather     Heart attack Paternal Grandfather     Heart disease Paternal Grandfather     Asthma Father     Hyperlipidemia Father     Heart disease Paternal Uncle     Heart disease Maternal Grandmother     Heart disease Paternal Grandmother        Social History:   Social History     Socioeconomic History    Marital status:    Tobacco Use    Smoking status: Never     Passive exposure: Past    Smokeless tobacco: Never   Vaping Use    Vaping status: Never Used   Substance and " "Sexual Activity    Alcohol use: No    Drug use: No    Sexual activity: Yes     Partners: Female     Birth control/protection: None       Immunizations:   Immunization History   Administered Date(s) Administered    COVID-19 (PFIZER) Purple Cap Monovalent 03/06/2021, 03/31/2021    Influenza Seasonal Injectable 12/01/2020    Tdap 07/31/2018    flucelvax quad pfs =>4 YRS 11/01/2019        Medications:     Current Outpatient Medications:     cetirizine (ZYRTEC ALLERGY) 10 MG tablet, Zyrtec 10 mg tablet  Daily, Disp: , Rfl:     losartan (COZAAR) 100 MG tablet, Take 1 tablet by mouth Daily., Disp: 90 tablet, Rfl: 3    montelukast (SINGULAIR) 10 MG tablet, Take 1 tablet by mouth Every Night., Disp: 30 tablet, Rfl: 1    nebivolol (BYSTOLIC) 5 MG tablet, Take 1 tablet by mouth Daily., Disp: , Rfl:     azithromycin (Zithromax Z-Bernard) 250 MG tablet, Take 2 tablets by mouth on day 1, then 1 tablet daily on days 2-5, Disp: 6 tablet, Rfl: 0    Allergies:   Allergies   Allergen Reactions    Lisinopril Cough    Penicillins Other (See Comments)     As baby, pt does not know reaction       Objective     Vital Signs  /78   Pulse 116   Temp 97.8 °F (36.6 °C) (Temporal)   Ht 180.3 cm (70.98\")   Wt 108 kg (237 lb 12.8 oz)   SpO2 96%   BMI 33.18 kg/m²   Estimated body mass index is 33.18 kg/m² as calculated from the following:    Height as of this encounter: 180.3 cm (70.98\").    Weight as of this encounter: 108 kg (237 lb 12.8 oz).            Physical Exam  Constitutional:       Appearance: Normal appearance. He is obese.   Cardiovascular:      Rate and Rhythm: Normal rate and regular rhythm.      Pulses: Normal pulses.      Heart sounds: Normal heart sounds.   Pulmonary:      Effort: Pulmonary effort is normal.      Breath sounds: Normal breath sounds.   Neurological:      Mental Status: He is alert.          Result Review :     Common labs          3/6/2024    09:57   Common Labs   Glucose 142    BUN 13    Creatinine 1.03  "   Sodium 134    Potassium 3.9    Chloride 101    Calcium 9.4    Albumin 4.5    Total Bilirubin 0.4    Alkaline Phosphatase 97    AST (SGOT) 26    ALT (SGPT) 35    WBC 9.01    Hemoglobin 16.4    Hematocrit 48.6    Platelets 336    Total Cholesterol 201    Triglycerides 145    HDL Cholesterol 39    LDL Cholesterol  136    Hemoglobin A1C 5.10                 Assessment and Plan     1. Well adult exam  The following were discussed at today's wellness visit today: preventaitve: Nutrition, Physical activity, Healthy weight, Mental health, Immunizations, and Screenings    - Basic metabolic panel; Future  - Lipid panel; Future  - CBC No Differential; Future    2. Hypertension, unspecified type  Controlled  Continue with Losartan 100mg daily and Bystolic 5mg   - Basic metabolic panel; Future  - Lipid panel; Future  - CBC No Differential; Future    3. Chronic sinusitis, unspecified location    - Ambulatory Referral to ENT (Otolaryngology)  - Basic metabolic panel; Future  - Lipid panel; Future  - CBC No Differential; Future    4. Chronic allergic rhinitis    - Ambulatory Referral to ENT (Otolaryngology)  - Basic metabolic panel; Future  - Lipid panel; Future  - CBC No Differential; Future    5. High serum low density lipoprotein (LDL) cholesterol    - Lipid panel; Future    6. Morbid (severe) obesity due to excess calories  Discussed risk associated with obesity. he was given instructions on calorie counting as well as on decreasing carbohydrate intake. he was also encouraged to start exercise regimen.  Instructed patient to download fitness donna on his smart phone to aid in calorie counting and exercise tracking.         Follow Up  Return in about 1 year (around 2/6/2026) for Annual physical.    MD DARRELL LirianoE [facility] [address]  [facility]  [address]  [address] [address] [phone]

## 2025-03-05 ENCOUNTER — TELEPHONE (OUTPATIENT)
Dept: INTERNAL MEDICINE | Facility: CLINIC | Age: 35
End: 2025-03-05
Payer: COMMERCIAL

## 2025-03-05 DIAGNOSIS — J45.40 MODERATE PERSISTENT ASTHMA WITHOUT COMPLICATION: ICD-10-CM

## 2025-03-05 RX ORDER — MONTELUKAST SODIUM 10 MG/1
10 TABLET ORAL NIGHTLY
Qty: 30 TABLET | Refills: 0 | Status: SHIPPED | OUTPATIENT
Start: 2025-03-05

## 2025-03-14 ENCOUNTER — TELEPHONE (OUTPATIENT)
Dept: CARDIOLOGY | Facility: CLINIC | Age: 35
End: 2025-03-14

## 2025-03-14 NOTE — TELEPHONE ENCOUNTER
Caller: Hayder Farley    Relationship to patient: Self    Best call back number: [phone]         Type of visit: F/U APPT    Requested date: NEXT AVAILABLE     If rescheduling, when is the original appointment: 3-14-25     Additional notes:PLEASE CONTACT PATIENT WITH A SUITABLE DATE.

## 2025-05-18 ENCOUNTER — PATIENT ROUNDING (BHMG ONLY) (OUTPATIENT)
Dept: URGENT CARE | Facility: CLINIC | Age: 35
End: 2025-05-18
Payer: COMMERCIAL

## 2025-05-23 ENCOUNTER — OFFICE VISIT (OUTPATIENT)
Dept: INTERNAL MEDICINE | Facility: CLINIC | Age: 35
End: 2025-05-23
Payer: COMMERCIAL

## 2025-05-23 VITALS
WEIGHT: 235.4 LBS | BODY MASS INDEX: 32.96 KG/M2 | HEIGHT: 71 IN | DIASTOLIC BLOOD PRESSURE: 86 MMHG | HEART RATE: 121 BPM | TEMPERATURE: 97.5 F | OXYGEN SATURATION: 97 % | SYSTOLIC BLOOD PRESSURE: 136 MMHG

## 2025-05-23 DIAGNOSIS — M62.838 NECK MUSCLE SPASM: ICD-10-CM

## 2025-05-23 DIAGNOSIS — I10 ESSENTIAL HYPERTENSION: Primary | ICD-10-CM

## 2025-05-23 PROCEDURE — 99214 OFFICE O/P EST MOD 30 MIN: CPT | Performed by: STUDENT IN AN ORGANIZED HEALTH CARE EDUCATION/TRAINING PROGRAM

## 2025-05-23 RX ORDER — METHOCARBAMOL 750 MG/1
750 TABLET, FILM COATED ORAL 4 TIMES DAILY PRN
Qty: 30 TABLET | Refills: 0 | Status: SHIPPED | OUTPATIENT
Start: 2025-05-23 | End: 2025-06-13

## 2025-05-23 NOTE — PROGRESS NOTES
"    Office Note     Name: Hayder Farley    : 1990     MRN: [id number]     Chief Complaint  Shoulder Pain ([facility] f/u lft side /), Neck Pain, and Back Pain    Subjective     History of Present Illness:  Hayder Farley is a 34 y.o. male who presents today for     Woke up last thrusdy stiff nek, emile   Pain radiate down his left arm.   Can't pinpoint specific moment  Certain movements of the neck alleviate the pain or exacerbate the pain. Has noticed some mild weakness of LUE due to pain.   He has been to a chiroprator,   Work at [employer];     HTN  Has been on medicine for a chronic period of time  Currently: Losartan 100mg daily and Bystolic 5mg daily      Review of Systems:   Review of Systems   All other systems reviewed and are negative.      Past Medical History:   Past Medical History:   Diagnosis Date    Allergic     As a toddler    Asthma     Essential hypertension 2019    Hyperlipidemia LDL goal <100 2020    Hypertension     STATES \"ITS WHITE COAT HYPERTENSION\"       Past Surgical History:   Past Surgical History:   Procedure Laterality Date    MOUTH SURGERY         Family History:   Family History   Problem Relation Age of Onset    Cancer Mother         Breast Cancer    Breast cancer Mother     Heart attack Maternal Grandfather     Heart disease Maternal Grandfather     Heart attack Paternal Grandfather     Heart disease Paternal Grandfather     Asthma Father     Hyperlipidemia Father     Heart disease Paternal Uncle     Heart disease Maternal Grandmother     Heart disease Paternal Grandmother        Social History:   Social History     Socioeconomic History    Marital status:    Tobacco Use    Smoking status: Never     Passive exposure: Past    Smokeless tobacco: Never   Vaping Use    Vaping status: Never Used   Substance and Sexual Activity    Alcohol use: No    Drug use: No    Sexual activity: Yes     Partners: Female     Birth control/protection: None " "      Immunizations:   Immunization History   Administered Date(s) Administered    COVID-19 (PFIZER) Purple Cap Monovalent 03/06/2021, 03/31/2021    Influenza Seasonal Injectable 12/01/2020    Tdap 07/31/2018    flucelvax quad pfs =>4 YRS 11/01/2019        Medications:     Current Outpatient Medications:     cetirizine (ZYRTEC ALLERGY) 10 MG tablet, Zyrtec 10 mg tablet  Daily, Disp: , Rfl:     losartan (COZAAR) 100 MG tablet, Take 1 tablet by mouth Daily., Disp: 90 tablet, Rfl: 3    methocarbamol (ROBAXIN) 750 MG tablet, Take 1 tablet by mouth 4 (Four) Times a Day As Needed for Muscle Spasms., Disp: 30 tablet, Rfl: 0    montelukast (SINGULAIR) 10 MG tablet, TAKE 1 TABLET BY MOUTH ONCE NIGHTLY, Disp: 30 tablet, Rfl: 0    nebivolol (BYSTOLIC) 5 MG tablet, TAKE 1 TABLET BY MOUTH DAILY, Disp: 30 tablet, Rfl: 11    Allergies:   Allergies   Allergen Reactions    Lisinopril Cough    Penicillins Other (See Comments)     As baby, pt does not know reaction    Product containing penicillin (product)       Objective     Vital Signs  /86   Pulse (!) 121   Temp 97.5 °F (36.4 °C) (Temporal)   Ht 180.3 cm (70.98\")   Wt 107 kg (235 lb 6.4 oz)   SpO2 97%   BMI 32.85 kg/m²   Estimated body mass index is 32.85 kg/m² as calculated from the following:    Height as of this encounter: 180.3 cm (70.98\").    Weight as of this encounter: 107 kg (235 lb 6.4 oz).            Physical Exam  Constitutional:       Appearance: Normal appearance.   Cardiovascular:      Rate and Rhythm: Normal rate and regular rhythm.      Pulses: Normal pulses.      Heart sounds: Normal heart sounds.   Pulmonary:      Effort: Pulmonary effort is normal.      Breath sounds: Normal breath sounds.   Musculoskeletal:      Cervical back: Spinous process tenderness and muscular tenderness present.   Neurological:      Mental Status: He is alert.          Result Review :                  Assessment and Plan     1. Essential hypertension  Controlled, continue " with Anti-HTN medications    2. Neck muscle spasm    - methocarbamol (ROBAXIN) 750 MG tablet; Take 1 tablet by mouth 4 (Four) Times a Day As Needed for Muscle Spasms.  Dispense: 30 tablet; Refill: 0       Follow Up  No follow-ups on file.    Leonel Sifuentes MD  [facility] [address]  [facility]  [address]  [address] [address] [phone]

## 2025-05-29 DIAGNOSIS — J45.40 MODERATE PERSISTENT ASTHMA WITHOUT COMPLICATION: ICD-10-CM

## 2025-05-30 RX ORDER — NEBIVOLOL 5 MG/1
5 TABLET ORAL DAILY
Qty: 30 TABLET | Refills: 11 | Status: SHIPPED | OUTPATIENT
Start: 2025-05-30

## 2025-05-30 RX ORDER — MONTELUKAST SODIUM 10 MG/1
10 TABLET ORAL NIGHTLY
Qty: 30 TABLET | Refills: 0 | Status: SHIPPED | OUTPATIENT
Start: 2025-05-30

## 2025-06-13 ENCOUNTER — OFFICE VISIT (OUTPATIENT)
Dept: CARDIOLOGY | Facility: CLINIC | Age: 35
End: 2025-06-13
Payer: COMMERCIAL

## 2025-06-13 VITALS
HEART RATE: 99 BPM | OXYGEN SATURATION: 97 % | DIASTOLIC BLOOD PRESSURE: 88 MMHG | HEIGHT: 71 IN | SYSTOLIC BLOOD PRESSURE: 122 MMHG | BODY MASS INDEX: 32.42 KG/M2 | WEIGHT: 231.6 LBS

## 2025-06-13 DIAGNOSIS — R00.2 PALPITATIONS: ICD-10-CM

## 2025-06-13 DIAGNOSIS — G47.33 OSA (OBSTRUCTIVE SLEEP APNEA): Primary | ICD-10-CM

## 2025-06-13 DIAGNOSIS — I10 ESSENTIAL HYPERTENSION: ICD-10-CM

## 2025-06-13 PROCEDURE — 99214 OFFICE O/P EST MOD 30 MIN: CPT | Performed by: INTERNAL MEDICINE

## 2025-06-13 RX ORDER — DIAZEPAM 10 MG/1
10 TABLET ORAL
COMMUNITY
Start: 2025-05-29 | End: 2025-06-13

## 2025-06-13 NOTE — PROGRESS NOTES
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 Shahid 2020    PROBLEM LIST:   Tachycardia/PVCs  1/20 GXT: normal 11:22   1/20 Echo: EF >55% wnl valves  5/24 sinus tach  HTN  Reported whitecoat hypertension, negative workup at age 15  On losartan since ~2019 2024 secondary evaluation with mildly elevated catecholamines /metanephrines  Hyperhidrosis  HLD  3/24  [phone]  Surgical history:  Oral surgery      CC:  Chief Complaint   Patient presents with    Essential hypertension     F/U       Allergies  Allergies   Allergen Reactions    Lisinopril Cough    Penicillins Other (See Comments)     As baby, pt does not know reaction    Product containing penicillin (product)       Current Medications  Current Outpatient Medications   Medication Instructions    cetirizine (ZYRTEC ALLERGY) 10 MG tablet Zyrtec 10 mg tablet   Daily    losartan (COZAAR) 100 mg, Oral, Daily    montelukast (SINGULAIR) 10 mg, Oral, Nightly    nebivolol (BYSTOLIC) 5 mg, Oral, Daily        History of Present Illness   HPI  Hayder Farley is a 34 y.o. year old male with the above mentioned PMH who presents for follow-up.  Has been tolerant of medications.  He does state that his wife has told him that he does have excessive snoring.      Vitals:    06/13/25 1422   BP: 122/88   BP Location: Left arm   Patient Position: Sitting   Cuff Size: Adult   Pulse: 99   SpO2: 97%   Weight: 105 kg (231 lb 9.6 oz)   Height: 180.3 cm (71\")     Body mass index is 32.3 kg/m².     PHYSICAL EXAMINATION:  Vitals reviewed.   Constitutional:       Appearance: Normal and healthy appearance. Well-developed and not in distress.   Neck:      Thyroid: Thyroid normal.   Pulmonary:     "  Effort: Pulmonary effort is normal.      Breath sounds: Normal breath sounds.   Cardiovascular:      PMI at left midclavicular line. Tachycardia present. Regular rhythm. Normal S1. Normal S2.       Murmurs: There is no murmur.      No gallop.  No rub.   Pulses:     Intact distal pulses.   Edema:     Peripheral edema absent.   Skin:     General: Skin is warm and dry.   Neurological:      General: No focal deficit present.      Mental Status: Alert and oriented to person, place and time.   Psychiatric:         Behavior: Behavior is cooperative.         Diagnostic Data:  Procedures  Lab Results   Component Value Date    CHOL 201 (H) 03/06/2024    CHLPL 204 (H) 01/10/2020    TRIG 145 03/06/2024    HDL 39 (L) 03/06/2024     (H) 03/06/2024      Lab Results   Component Value Date    GLUCOSE 142 (H) 03/06/2024    CALCIUM 9.4 03/06/2024     (L) 03/06/2024    K 3.9 03/06/2024    CO2 18.4 (L) 03/06/2024     03/06/2024    BUN 13 03/06/2024    CREATININE 1.03 03/06/2024    EGFR 97.8 03/06/2024    BCR 12.6 03/06/2024    ANIONGAP 14.6 03/06/2024      Lab Results   Component Value Date    WBC 9.01 03/06/2024    HGB 16.4 03/06/2024    HCT 48.6 03/06/2024    MCV 85.7 03/06/2024     03/06/2024     Lab Results   Component Value Date    HGBA1C 5.10 03/06/2024      Lab Results   Component Value Date    TSH 0.906 05/24/2024          ASSESSMENT:     Diagnosis Plan   1. LOS (obstructive sleep apnea)  Home Sleep Study      2. Essential hypertension        3. Palpitations              PLAN:      Tachycardia improved on beta-blockade    Hypertension-improved will document overnight oximetry        Results will be called to him and we will schedule follow-up in 3 months.    Leonel Sifuentes MD, thank you for referring Mr. Farley for evaluation.  I have forwarded my electronically generated recommendations to you for review.  Please do not hesitate to call with any questions.     Jeison Lau MD  6/13/2025  14:41 EDT

## 2025-07-14 DIAGNOSIS — J45.40 MODERATE PERSISTENT ASTHMA WITHOUT COMPLICATION: ICD-10-CM

## 2025-07-14 RX ORDER — MONTELUKAST SODIUM 10 MG/1
10 TABLET ORAL NIGHTLY
Qty: 30 TABLET | Refills: 0 | Status: CANCELLED | OUTPATIENT
Start: 2025-07-14

## 2025-07-16 DIAGNOSIS — J45.40 MODERATE PERSISTENT ASTHMA WITHOUT COMPLICATION: ICD-10-CM

## 2025-07-16 RX ORDER — MONTELUKAST SODIUM 10 MG/1
10 TABLET ORAL NIGHTLY
Qty: 30 TABLET | Refills: 0 | Status: SHIPPED | OUTPATIENT
Start: 2025-07-16

## 2025-08-12 DIAGNOSIS — J45.40 MODERATE PERSISTENT ASTHMA WITHOUT COMPLICATION: ICD-10-CM

## 2025-08-12 RX ORDER — MONTELUKAST SODIUM 10 MG/1
10 TABLET ORAL NIGHTLY
Qty: 90 TABLET | Refills: 0 | Status: SHIPPED | OUTPATIENT
Start: 2025-08-12